# Patient Record
Sex: MALE | Race: BLACK OR AFRICAN AMERICAN | NOT HISPANIC OR LATINO | URBAN - METROPOLITAN AREA
[De-identification: names, ages, dates, MRNs, and addresses within clinical notes are randomized per-mention and may not be internally consistent; named-entity substitution may affect disease eponyms.]

---

## 2022-03-29 ENCOUNTER — INPATIENT (INPATIENT)
Facility: HOSPITAL | Age: 42
LOS: 1 days | Discharge: LEFT BEFORE DISCHARGE | DRG: 378 | End: 2022-03-31
Attending: HOSPITALIST | Admitting: STUDENT IN AN ORGANIZED HEALTH CARE EDUCATION/TRAINING PROGRAM
Payer: COMMERCIAL

## 2022-03-29 VITALS
OXYGEN SATURATION: 94 % | HEIGHT: 70 IN | TEMPERATURE: 98 F | SYSTOLIC BLOOD PRESSURE: 161 MMHG | DIASTOLIC BLOOD PRESSURE: 104 MMHG | WEIGHT: 250 LBS | HEART RATE: 86 BPM | RESPIRATION RATE: 18 BRPM

## 2022-03-29 LAB
ALBUMIN SERPL ELPH-MCNC: 4 G/DL — SIGNIFICANT CHANGE UP (ref 3.4–5)
ALP SERPL-CCNC: 69 U/L — SIGNIFICANT CHANGE UP (ref 40–120)
ALT FLD-CCNC: 30 U/L — SIGNIFICANT CHANGE UP (ref 12–42)
ANION GAP SERPL CALC-SCNC: 12 MMOL/L — SIGNIFICANT CHANGE UP (ref 9–16)
APTT BLD: 32.4 SEC — SIGNIFICANT CHANGE UP (ref 27.5–35.5)
AST SERPL-CCNC: 33 U/L — SIGNIFICANT CHANGE UP (ref 15–37)
BASOPHILS # BLD AUTO: 0.02 K/UL — SIGNIFICANT CHANGE UP (ref 0–0.2)
BASOPHILS # BLD AUTO: 0.04 K/UL — SIGNIFICANT CHANGE UP (ref 0–0.2)
BASOPHILS NFR BLD AUTO: 0.3 % — SIGNIFICANT CHANGE UP (ref 0–2)
BASOPHILS NFR BLD AUTO: 0.5 % — SIGNIFICANT CHANGE UP (ref 0–2)
BILIRUB SERPL-MCNC: 1.1 MG/DL — SIGNIFICANT CHANGE UP (ref 0.2–1.2)
BLD GP AB SCN SERPL QL: NEGATIVE — SIGNIFICANT CHANGE UP
BUN SERPL-MCNC: 16 MG/DL — SIGNIFICANT CHANGE UP (ref 7–23)
CALCIUM SERPL-MCNC: 9.1 MG/DL — SIGNIFICANT CHANGE UP (ref 8.5–10.5)
CHLORIDE SERPL-SCNC: 103 MMOL/L — SIGNIFICANT CHANGE UP (ref 96–108)
CO2 SERPL-SCNC: 25 MMOL/L — SIGNIFICANT CHANGE UP (ref 22–31)
CREAT SERPL-MCNC: 1.04 MG/DL — SIGNIFICANT CHANGE UP (ref 0.5–1.3)
EGFR: 91 ML/MIN/1.73M2 — SIGNIFICANT CHANGE UP
EOSINOPHIL # BLD AUTO: 0.1 K/UL — SIGNIFICANT CHANGE UP (ref 0–0.5)
EOSINOPHIL # BLD AUTO: 0.12 K/UL — SIGNIFICANT CHANGE UP (ref 0–0.5)
EOSINOPHIL NFR BLD AUTO: 1.4 % — SIGNIFICANT CHANGE UP (ref 0–6)
EOSINOPHIL NFR BLD AUTO: 1.5 % — SIGNIFICANT CHANGE UP (ref 0–6)
GLUCOSE SERPL-MCNC: 99 MG/DL — SIGNIFICANT CHANGE UP (ref 70–99)
HCT VFR BLD CALC: 35.8 % — LOW (ref 39–50)
HCT VFR BLD CALC: 41.8 % — SIGNIFICANT CHANGE UP (ref 39–50)
HGB BLD-MCNC: 12.2 G/DL — LOW (ref 13–17)
HGB BLD-MCNC: 14.4 G/DL — SIGNIFICANT CHANGE UP (ref 13–17)
IMM GRANULOCYTES NFR BLD AUTO: 0.1 % — SIGNIFICANT CHANGE UP (ref 0–1.5)
IMM GRANULOCYTES NFR BLD AUTO: 0.2 % — SIGNIFICANT CHANGE UP (ref 0–1.5)
INR BLD: 1.03 — SIGNIFICANT CHANGE UP (ref 0.88–1.16)
LACTATE SERPL-SCNC: 0.7 MMOL/L — SIGNIFICANT CHANGE UP (ref 0.4–2)
LIDOCAIN IGE QN: 198 U/L — SIGNIFICANT CHANGE UP (ref 73–393)
LYMPHOCYTES # BLD AUTO: 2.15 K/UL — SIGNIFICANT CHANGE UP (ref 1–3.3)
LYMPHOCYTES # BLD AUTO: 3.43 K/UL — HIGH (ref 1–3.3)
LYMPHOCYTES # BLD AUTO: 31.4 % — SIGNIFICANT CHANGE UP (ref 13–44)
LYMPHOCYTES # BLD AUTO: 39.5 % — SIGNIFICANT CHANGE UP (ref 13–44)
MAGNESIUM SERPL-MCNC: 2.2 MG/DL — SIGNIFICANT CHANGE UP (ref 1.6–2.6)
MCHC RBC-ENTMCNC: 30.3 PG — SIGNIFICANT CHANGE UP (ref 27–34)
MCHC RBC-ENTMCNC: 30.6 PG — SIGNIFICANT CHANGE UP (ref 27–34)
MCHC RBC-ENTMCNC: 34.1 GM/DL — SIGNIFICANT CHANGE UP (ref 32–36)
MCHC RBC-ENTMCNC: 34.4 GM/DL — SIGNIFICANT CHANGE UP (ref 32–36)
MCV RBC AUTO: 88.7 FL — SIGNIFICANT CHANGE UP (ref 80–100)
MCV RBC AUTO: 89.1 FL — SIGNIFICANT CHANGE UP (ref 80–100)
MONOCYTES # BLD AUTO: 0.69 K/UL — SIGNIFICANT CHANGE UP (ref 0–0.9)
MONOCYTES # BLD AUTO: 0.91 K/UL — HIGH (ref 0–0.9)
MONOCYTES NFR BLD AUTO: 10.1 % — SIGNIFICANT CHANGE UP (ref 2–14)
MONOCYTES NFR BLD AUTO: 10.5 % — SIGNIFICANT CHANGE UP (ref 2–14)
NEUTROPHILS # BLD AUTO: 3.87 K/UL — SIGNIFICANT CHANGE UP (ref 1.8–7.4)
NEUTROPHILS # BLD AUTO: 4.17 K/UL — SIGNIFICANT CHANGE UP (ref 1.8–7.4)
NEUTROPHILS NFR BLD AUTO: 47.9 % — SIGNIFICANT CHANGE UP (ref 43–77)
NEUTROPHILS NFR BLD AUTO: 56.6 % — SIGNIFICANT CHANGE UP (ref 43–77)
NRBC # BLD: 0 /100 WBCS — SIGNIFICANT CHANGE UP (ref 0–0)
NRBC # BLD: 0 /100 WBCS — SIGNIFICANT CHANGE UP (ref 0–0)
NT-PROBNP SERPL-SCNC: 14 PG/ML — SIGNIFICANT CHANGE UP
PLATELET # BLD AUTO: 294 K/UL — SIGNIFICANT CHANGE UP (ref 150–400)
PLATELET # BLD AUTO: 297 K/UL — SIGNIFICANT CHANGE UP (ref 150–400)
POTASSIUM SERPL-MCNC: 3.4 MMOL/L — LOW (ref 3.5–5.3)
POTASSIUM SERPL-SCNC: 3.4 MMOL/L — LOW (ref 3.5–5.3)
PROT SERPL-MCNC: 7.1 G/DL — SIGNIFICANT CHANGE UP (ref 6.4–8.2)
PROTHROM AB SERPL-ACNC: 11.9 SEC — SIGNIFICANT CHANGE UP (ref 10.5–13.4)
RBC # BLD: 4.02 M/UL — LOW (ref 4.2–5.8)
RBC # BLD: 4.71 M/UL — SIGNIFICANT CHANGE UP (ref 4.2–5.8)
RBC # FLD: 12.8 % — SIGNIFICANT CHANGE UP (ref 10.3–14.5)
RBC # FLD: 12.9 % — SIGNIFICANT CHANGE UP (ref 10.3–14.5)
RH IG SCN BLD-IMP: POSITIVE — SIGNIFICANT CHANGE UP
SARS-COV-2 RNA SPEC QL NAA+PROBE: SIGNIFICANT CHANGE UP
SODIUM SERPL-SCNC: 140 MMOL/L — SIGNIFICANT CHANGE UP (ref 132–145)
TROPONIN I, HIGH SENSITIVITY RESULT: 64.6 NG/L — SIGNIFICANT CHANGE UP
WBC # BLD: 6.84 K/UL — SIGNIFICANT CHANGE UP (ref 3.8–10.5)
WBC # BLD: 8.69 K/UL — SIGNIFICANT CHANGE UP (ref 3.8–10.5)
WBC # FLD AUTO: 6.84 K/UL — SIGNIFICANT CHANGE UP (ref 3.8–10.5)
WBC # FLD AUTO: 8.69 K/UL — SIGNIFICANT CHANGE UP (ref 3.8–10.5)

## 2022-03-29 PROCEDURE — 71275 CT ANGIOGRAPHY CHEST: CPT | Mod: 26

## 2022-03-29 PROCEDURE — 99285 EMERGENCY DEPT VISIT HI MDM: CPT

## 2022-03-29 PROCEDURE — 74174 CTA ABD&PLVS W/CONTRAST: CPT | Mod: 26

## 2022-03-29 PROCEDURE — 99291 CRITICAL CARE FIRST HOUR: CPT

## 2022-03-29 RX ORDER — ONDANSETRON 8 MG/1
4 TABLET, FILM COATED ORAL ONCE
Refills: 0 | Status: COMPLETED | OUTPATIENT
Start: 2022-03-29 | End: 2022-03-29

## 2022-03-29 RX ORDER — SODIUM CHLORIDE 9 MG/ML
1000 INJECTION, SOLUTION INTRAVENOUS ONCE
Refills: 0 | Status: COMPLETED | OUTPATIENT
Start: 2022-03-29 | End: 2022-03-29

## 2022-03-29 RX ORDER — PANTOPRAZOLE SODIUM 20 MG/1
8 TABLET, DELAYED RELEASE ORAL
Qty: 80 | Refills: 0 | Status: DISCONTINUED | OUTPATIENT
Start: 2022-03-29 | End: 2022-03-31

## 2022-03-29 RX ORDER — PANTOPRAZOLE SODIUM 20 MG/1
80 TABLET, DELAYED RELEASE ORAL ONCE
Refills: 0 | Status: COMPLETED | OUTPATIENT
Start: 2022-03-29 | End: 2022-03-29

## 2022-03-29 RX ADMIN — SODIUM CHLORIDE 1000 MILLILITER(S): 9 INJECTION, SOLUTION INTRAVENOUS at 23:32

## 2022-03-29 RX ADMIN — PANTOPRAZOLE SODIUM 80 MILLIGRAM(S): 20 TABLET, DELAYED RELEASE ORAL at 23:32

## 2022-03-29 RX ADMIN — ONDANSETRON 4 MILLIGRAM(S): 8 TABLET, FILM COATED ORAL at 23:32

## 2022-03-29 RX ADMIN — PANTOPRAZOLE SODIUM 10 MG/HR: 20 TABLET, DELAYED RELEASE ORAL at 23:30

## 2022-03-29 NOTE — ED ADULT NURSE REASSESSMENT NOTE - NS ED NURSE REASSESS COMMENT FT1
Pt. received AAOx3 semi fowlers in stretcher breathing at ease on room air in no apparent distress. 18g to LAC no redness, swelling, or tenderness noted. Pt. c/o chest and back pain. Pending labs and x-ray. Monitoring ongoing.

## 2022-03-29 NOTE — ED PROVIDER NOTE - PROGRESS NOTE DETAILS
Prior to discharge pt had an episode of massive hemoptysis ~500 mL EMS here to transport pt to the ICU at [facility] /72 and HR 75 pt is well appearing with no episodes of vomiting and speaking full sentneces Prior to discharge pt had an episode of massive hemoptysis ~500 mL, pt states that he is feeling fine with no abd pain and no lightheadedness.    2 18G IV's placed, Protonix push and drip started and pt endorsed to the ICU accepted to the MICU under Dr Hdez.

## 2022-03-29 NOTE — ED PROVIDER NOTE - ATTENDING CONTRIBUTION TO CARE
Patient had large hematemesis in bathroom, called ICU and patient upgraded to intensive care and transfer center aware

## 2022-03-29 NOTE — ED PROVIDER NOTE - CHPI ED SYMPTOMS NEG
no dizziness/no fever/no nausea/no numbness/no tingling/no weakness/no chills/no decreased eating/drinking

## 2022-03-29 NOTE — ED PROVIDER NOTE - NS ED ATTENDING STATEMENT MOD
This was a shared visit with the PATEL. I reviewed and verified the documentation and independently performed the documented:

## 2022-03-29 NOTE — ED ADULT TRIAGE NOTE - SPO2 (%)
94 Humira Counseling:  I discussed with the patient the risks of adalimumab including but not limited to myelosuppression, immunosuppression, autoimmune hepatitis, demyelinating diseases, lymphoma, and serious infections.  The patient understands that monitoring is required including a PPD at baseline and must alert us or the primary physician if symptoms of infection or other concerning signs are noted.

## 2022-03-29 NOTE — ED PROVIDER NOTE - PATIENT PORTAL LINK FT
You can access the FollowMyHealth Patient Portal offered by [facility] by registering at the following website: [email or web address]. By joining [facility]’s FollowMyHealth portal, you will also be able to view your health information using other applications (apps) compatible with our system.

## 2022-03-29 NOTE — ED PROVIDER NOTE - CARE PLAN
Principal Discharge DX:	Back pain   1 Principal Discharge DX:	GI bleed  Secondary Diagnosis:	Massive hemoptysis  Secondary Diagnosis:	Back pain

## 2022-03-29 NOTE — ED PROVIDER NOTE - CLINICAL SUMMARY MEDICAL DECISION MAKING FREE TEXT BOX
Patient here with back pain radiating to the chest. Awoke from sleep.   Denies pmhx   Exam unremarkable     Plan: labs,ekg,CTA r/o dissection

## 2022-03-29 NOTE — ED PROVIDER NOTE - OBJECTIVE STATEMENT
42 y/o male denies pmhx here with back pain radiating to the chest. Patient states that the pain is sharp and woke up him sleep. Denies fever, chills,abdominal pain, change in bowel function, flank pain, rash, HA, dizziness, SOB, CP, palpitations, diaphoresis, cough, and malaise. Patient appears well NAD stable.

## 2022-03-29 NOTE — ED ADULT NURSE REASSESSMENT NOTE - NS ED NURSE REASSESS COMMENT FT1
Pt. placed on CCM, and relocated for close obs. Pt. will be admitted to [facility] ICU for further eval. Pt. states that CP, back pain, and nausea.

## 2022-03-30 LAB
ALBUMIN SERPL ELPH-MCNC: 3.6 G/DL — SIGNIFICANT CHANGE UP (ref 3.3–5)
ALBUMIN SERPL ELPH-MCNC: 3.7 G/DL — SIGNIFICANT CHANGE UP (ref 3.3–5)
ALP SERPL-CCNC: 52 U/L — SIGNIFICANT CHANGE UP (ref 40–120)
ALP SERPL-CCNC: 55 U/L — SIGNIFICANT CHANGE UP (ref 40–120)
ALT FLD-CCNC: 17 U/L — SIGNIFICANT CHANGE UP (ref 10–45)
ALT FLD-CCNC: 18 U/L — SIGNIFICANT CHANGE UP (ref 10–45)
ANION GAP SERPL CALC-SCNC: 10 MMOL/L — SIGNIFICANT CHANGE UP (ref 5–17)
ANION GAP SERPL CALC-SCNC: 9 MMOL/L — SIGNIFICANT CHANGE UP (ref 5–17)
AST SERPL-CCNC: 22 U/L — SIGNIFICANT CHANGE UP (ref 10–40)
AST SERPL-CCNC: 24 U/L — SIGNIFICANT CHANGE UP (ref 10–40)
BASOPHILS # BLD AUTO: 0.03 K/UL — SIGNIFICANT CHANGE UP (ref 0–0.2)
BASOPHILS NFR BLD AUTO: 0.4 % — SIGNIFICANT CHANGE UP (ref 0–2)
BILIRUB SERPL-MCNC: 1 MG/DL — SIGNIFICANT CHANGE UP (ref 0.2–1.2)
BILIRUB SERPL-MCNC: 1.1 MG/DL — SIGNIFICANT CHANGE UP (ref 0.2–1.2)
BUN SERPL-MCNC: 14 MG/DL — SIGNIFICANT CHANGE UP (ref 7–23)
BUN SERPL-MCNC: 17 MG/DL — SIGNIFICANT CHANGE UP (ref 7–23)
CALCIUM SERPL-MCNC: 8 MG/DL — LOW (ref 8.4–10.5)
CALCIUM SERPL-MCNC: 8 MG/DL — LOW (ref 8.4–10.5)
CHLORIDE SERPL-SCNC: 106 MMOL/L — SIGNIFICANT CHANGE UP (ref 96–108)
CHLORIDE SERPL-SCNC: 108 MMOL/L — SIGNIFICANT CHANGE UP (ref 96–108)
CO2 SERPL-SCNC: 21 MMOL/L — LOW (ref 22–31)
CO2 SERPL-SCNC: 24 MMOL/L — SIGNIFICANT CHANGE UP (ref 22–31)
CREAT SERPL-MCNC: 0.84 MG/DL — SIGNIFICANT CHANGE UP (ref 0.5–1.3)
CREAT SERPL-MCNC: 1.04 MG/DL — SIGNIFICANT CHANGE UP (ref 0.5–1.3)
EGFR: 111 ML/MIN/1.73M2 — SIGNIFICANT CHANGE UP
EGFR: 91 ML/MIN/1.73M2 — SIGNIFICANT CHANGE UP
EOSINOPHIL # BLD AUTO: 0.08 K/UL — SIGNIFICANT CHANGE UP (ref 0–0.5)
EOSINOPHIL NFR BLD AUTO: 1.1 % — SIGNIFICANT CHANGE UP (ref 0–6)
GLUCOSE BLDC GLUCOMTR-MCNC: 96 MG/DL — SIGNIFICANT CHANGE UP (ref 70–99)
GLUCOSE SERPL-MCNC: 100 MG/DL — HIGH (ref 70–99)
GLUCOSE SERPL-MCNC: 97 MG/DL — SIGNIFICANT CHANGE UP (ref 70–99)
HCT VFR BLD CALC: 31.9 % — LOW (ref 39–50)
HCT VFR BLD CALC: 32.8 % — LOW (ref 39–50)
HGB BLD-MCNC: 10.7 G/DL — LOW (ref 13–17)
HGB BLD-MCNC: 10.7 G/DL — LOW (ref 13–17)
IMM GRANULOCYTES NFR BLD AUTO: 0.3 % — SIGNIFICANT CHANGE UP (ref 0–1.5)
LYMPHOCYTES # BLD AUTO: 2.6 K/UL — SIGNIFICANT CHANGE UP (ref 1–3.3)
LYMPHOCYTES # BLD AUTO: 35 % — SIGNIFICANT CHANGE UP (ref 13–44)
MAGNESIUM SERPL-MCNC: 1.9 MG/DL — SIGNIFICANT CHANGE UP (ref 1.6–2.6)
MAGNESIUM SERPL-MCNC: 2 MG/DL — SIGNIFICANT CHANGE UP (ref 1.6–2.6)
MCHC RBC-ENTMCNC: 29.2 PG — SIGNIFICANT CHANGE UP (ref 27–34)
MCHC RBC-ENTMCNC: 30.7 PG — SIGNIFICANT CHANGE UP (ref 27–34)
MCHC RBC-ENTMCNC: 32.6 GM/DL — SIGNIFICANT CHANGE UP (ref 32–36)
MCHC RBC-ENTMCNC: 33.5 GM/DL — SIGNIFICANT CHANGE UP (ref 32–36)
MCV RBC AUTO: 89.6 FL — SIGNIFICANT CHANGE UP (ref 80–100)
MCV RBC AUTO: 91.7 FL — SIGNIFICANT CHANGE UP (ref 80–100)
MONOCYTES # BLD AUTO: 0.72 K/UL — SIGNIFICANT CHANGE UP (ref 0–0.9)
MONOCYTES NFR BLD AUTO: 9.7 % — SIGNIFICANT CHANGE UP (ref 2–14)
NEUTROPHILS # BLD AUTO: 3.97 K/UL — SIGNIFICANT CHANGE UP (ref 1.8–7.4)
NEUTROPHILS NFR BLD AUTO: 53.5 % — SIGNIFICANT CHANGE UP (ref 43–77)
NRBC # BLD: 0 /100 WBCS — SIGNIFICANT CHANGE UP (ref 0–0)
NRBC # BLD: 0 /100 WBCS — SIGNIFICANT CHANGE UP (ref 0–0)
PHOSPHATE SERPL-MCNC: 2.1 MG/DL — LOW (ref 2.5–4.5)
PHOSPHATE SERPL-MCNC: 3 MG/DL — SIGNIFICANT CHANGE UP (ref 2.5–4.5)
PLATELET # BLD AUTO: 248 K/UL — SIGNIFICANT CHANGE UP (ref 150–400)
PLATELET # BLD AUTO: 253 K/UL — SIGNIFICANT CHANGE UP (ref 150–400)
POTASSIUM SERPL-MCNC: 3.9 MMOL/L — SIGNIFICANT CHANGE UP (ref 3.5–5.3)
POTASSIUM SERPL-MCNC: 4.1 MMOL/L — SIGNIFICANT CHANGE UP (ref 3.5–5.3)
POTASSIUM SERPL-SCNC: 3.9 MMOL/L — SIGNIFICANT CHANGE UP (ref 3.5–5.3)
POTASSIUM SERPL-SCNC: 4.1 MMOL/L — SIGNIFICANT CHANGE UP (ref 3.5–5.3)
PROT SERPL-MCNC: 5.5 G/DL — LOW (ref 6–8.3)
PROT SERPL-MCNC: 5.6 G/DL — LOW (ref 6–8.3)
RBC # BLD: 3.48 M/UL — LOW (ref 4.2–5.8)
RBC # BLD: 3.66 M/UL — LOW (ref 4.2–5.8)
RBC # FLD: 13.2 % — SIGNIFICANT CHANGE UP (ref 10.3–14.5)
RBC # FLD: 13.2 % — SIGNIFICANT CHANGE UP (ref 10.3–14.5)
SARS-COV-2 RNA SPEC QL NAA+PROBE: SIGNIFICANT CHANGE UP
SODIUM SERPL-SCNC: 137 MMOL/L — SIGNIFICANT CHANGE UP (ref 135–145)
SODIUM SERPL-SCNC: 141 MMOL/L — SIGNIFICANT CHANGE UP (ref 135–145)
WBC # BLD: 7.42 K/UL — SIGNIFICANT CHANGE UP (ref 3.8–10.5)
WBC # BLD: 7.88 K/UL — SIGNIFICANT CHANGE UP (ref 3.8–10.5)
WBC # FLD AUTO: 7.42 K/UL — SIGNIFICANT CHANGE UP (ref 3.8–10.5)
WBC # FLD AUTO: 7.88 K/UL — SIGNIFICANT CHANGE UP (ref 3.8–10.5)

## 2022-03-30 PROCEDURE — 71045 X-RAY EXAM CHEST 1 VIEW: CPT | Mod: 26

## 2022-03-30 PROCEDURE — 99291 CRITICAL CARE FIRST HOUR: CPT | Mod: 25

## 2022-03-30 PROCEDURE — 99222 1ST HOSP IP/OBS MODERATE 55: CPT

## 2022-03-30 PROCEDURE — 31500 INSERT EMERGENCY AIRWAY: CPT | Mod: GC

## 2022-03-30 PROCEDURE — 43255 EGD CONTROL BLEEDING ANY: CPT

## 2022-03-30 RX ORDER — CISATRACURIUM BESYLATE 2 MG/ML
20 INJECTION INTRAVENOUS ONCE
Refills: 0 | Status: COMPLETED | OUTPATIENT
Start: 2022-03-30 | End: 2022-03-30

## 2022-03-30 RX ORDER — NOREPINEPHRINE BITARTRATE/D5W 8 MG/250ML
0.05 PLASTIC BAG, INJECTION (ML) INTRAVENOUS
Qty: 8 | Refills: 0 | Status: DISCONTINUED | OUTPATIENT
Start: 2022-03-30 | End: 2022-03-30

## 2022-03-30 RX ORDER — MIDAZOLAM HYDROCHLORIDE 1 MG/ML
8 INJECTION, SOLUTION INTRAMUSCULAR; INTRAVENOUS ONCE
Refills: 0 | Status: DISCONTINUED | OUTPATIENT
Start: 2022-03-30 | End: 2022-03-30

## 2022-03-30 RX ORDER — SUCRALFATE 1 G
1 TABLET ORAL EVERY 6 HOURS
Refills: 0 | Status: DISCONTINUED | OUTPATIENT
Start: 2022-03-30 | End: 2022-03-31

## 2022-03-30 RX ORDER — CHLORHEXIDINE GLUCONATE 213 G/1000ML
1 SOLUTION TOPICAL
Refills: 0 | Status: DISCONTINUED | OUTPATIENT
Start: 2022-03-30 | End: 2022-03-31

## 2022-03-30 RX ORDER — FENTANYL CITRATE 50 UG/ML
0.5 INJECTION INTRAVENOUS
Qty: 2500 | Refills: 0 | Status: DISCONTINUED | OUTPATIENT
Start: 2022-03-30 | End: 2022-03-30

## 2022-03-30 RX ORDER — PROPOFOL 10 MG/ML
10 INJECTION, EMULSION INTRAVENOUS
Qty: 1000 | Refills: 0 | Status: DISCONTINUED | OUTPATIENT
Start: 2022-03-30 | End: 2022-03-30

## 2022-03-30 RX ORDER — FENTANYL CITRATE 50 UG/ML
200 INJECTION INTRAVENOUS ONCE
Refills: 0 | Status: DISCONTINUED | OUTPATIENT
Start: 2022-03-30 | End: 2022-03-30

## 2022-03-30 RX ORDER — SUCRALFATE 1 G
1 TABLET ORAL
Refills: 0 | Status: DISCONTINUED | OUTPATIENT
Start: 2022-03-30 | End: 2022-03-30

## 2022-03-30 RX ORDER — FENTANYL CITRATE 50 UG/ML
100 INJECTION INTRAVENOUS ONCE
Refills: 0 | Status: DISCONTINUED | OUTPATIENT
Start: 2022-03-30 | End: 2022-03-30

## 2022-03-30 RX ORDER — ERYTHROMYCIN ETHYLSUCCINATE 400 MG
250 TABLET ORAL ONCE
Refills: 0 | Status: COMPLETED | OUTPATIENT
Start: 2022-03-30 | End: 2022-03-30

## 2022-03-30 RX ORDER — MIDAZOLAM HYDROCHLORIDE 1 MG/ML
2 INJECTION, SOLUTION INTRAMUSCULAR; INTRAVENOUS ONCE
Refills: 0 | Status: DISCONTINUED | OUTPATIENT
Start: 2022-03-30 | End: 2022-03-30

## 2022-03-30 RX ORDER — INFLUENZA VIRUS VACCINE 15; 15; 15; 15 UG/.5ML; UG/.5ML; UG/.5ML; UG/.5ML
0.5 SUSPENSION INTRAMUSCULAR ONCE
Refills: 0 | Status: DISCONTINUED | OUTPATIENT
Start: 2022-03-30 | End: 2022-03-31

## 2022-03-30 RX ORDER — SODIUM CHLORIDE 9 MG/ML
1000 INJECTION, SOLUTION INTRAVENOUS
Refills: 0 | Status: DISCONTINUED | OUTPATIENT
Start: 2022-03-30 | End: 2022-03-30

## 2022-03-30 RX ADMIN — FENTANYL CITRATE 200 MICROGRAM(S): 50 INJECTION INTRAVENOUS at 11:20

## 2022-03-30 RX ADMIN — PROPOFOL 6.8 MICROGRAM(S)/KG/MIN: 10 INJECTION, EMULSION INTRAVENOUS at 12:03

## 2022-03-30 RX ADMIN — Medication 1 GRAM(S): at 18:36

## 2022-03-30 RX ADMIN — PROPOFOL 6.8 MICROGRAM(S)/KG/MIN: 10 INJECTION, EMULSION INTRAVENOUS at 13:18

## 2022-03-30 RX ADMIN — FENTANYL CITRATE 200 MICROGRAM(S): 50 INJECTION INTRAVENOUS at 11:50

## 2022-03-30 RX ADMIN — PANTOPRAZOLE SODIUM 10 MG/HR: 20 TABLET, DELAYED RELEASE ORAL at 06:01

## 2022-03-30 RX ADMIN — MIDAZOLAM HYDROCHLORIDE 6 MILLIGRAM(S): 1 INJECTION, SOLUTION INTRAMUSCULAR; INTRAVENOUS at 11:30

## 2022-03-30 RX ADMIN — CHLORHEXIDINE GLUCONATE 1 APPLICATION(S): 213 SOLUTION TOPICAL at 06:03

## 2022-03-30 RX ADMIN — Medication 1 GRAM(S): at 23:14

## 2022-03-30 RX ADMIN — Medication 62.5 MILLIMOLE(S): at 06:02

## 2022-03-30 RX ADMIN — Medication 250 MILLIGRAM(S): at 09:44

## 2022-03-30 RX ADMIN — SODIUM CHLORIDE 75 MILLILITER(S): 9 INJECTION, SOLUTION INTRAVENOUS at 06:02

## 2022-03-30 RX ADMIN — SODIUM CHLORIDE 75 MILLILITER(S): 9 INJECTION, SOLUTION INTRAVENOUS at 02:37

## 2022-03-30 NOTE — H&P ADULT - NSHPLABSRESULTS_GEN_ALL_CORE
12.2   8.69  )-----------( 294      ( 29 Mar 2022 23:24 )             35.8       03-29    140  |  103  |  16  ----------------------------<  99  3.4<L>   |  25  |  1.04    Ca    9.1      29 Mar 2022 19:17  Mg     2.2     03-29    TPro  7.1  /  Alb  4.0  /  TBili  1.1  /  DBili  x   /  AST  33  /  ALT  30  /  AlkPhos  69  03-29                  PT/INR - ( 29 Mar 2022 19:17 )   PT: 11.9 sec;   INR: 1.03          PTT - ( 29 Mar 2022 19:17 )  PTT:32.4 sec          CAPILLARY BLOOD GLUCOSE

## 2022-03-30 NOTE — H&P ADULT - NSHPPHYSICALEXAM_GEN_ALL_CORE
VITAL SIGNS:  T(C): 36.7 (03-29-22 @ 23:42), Max: 36.7 (03-29-22 @ 21:31)  T(F): 98.1 (03-29-22 @ 23:42), Max: 98.1 (03-29-22 @ 23:42)  HR: 73 (03-29-22 @ 23:42) (73 - 86)  BP: 111/63 (03-29-22 @ 23:42) (111/63 - 161/104)  BP(mean): --  RR: 18 (03-29-22 @ 23:42) (16 - 18)  SpO2: 99% (03-29-22 @ 23:42) (94% - 99%)  Wt(kg): --    PHYSICAL EXAM:    Constitutional: NAD  Head: NC/AT  Eyes: PERRL, EOMI, anicteric sclera  ENT: no nasal discharge; uvula midline, no oropharyngeal erythema or exudates; MMM  Neck: supple; no JVD or thyromegaly  Respiratory: CTA B/L; no W/R/R, no retractions  Cardiac: +S1/S2; RRR; no M/R/G; PMI non-displaced  Gastrointestinal: abdomen soft, NT/ND; no rebound or guarding; +BSx4  Extremities: WWP, no clubbing or cyanosis; no peripheral edema  Vascular: 2+ radial, femoral, DP/PT pulses B/L  Dermatologic: skin warm, dry and intact; no rashes, wounds, or scars  Neurologic: AAOx3; CNII-XII grossly intact; no focal deficits VITAL SIGNS:  T(C): 36.7 (03-29-22 @ 23:42), Max: 36.7 (03-29-22 @ 21:31)  T(F): 98.1 (03-29-22 @ 23:42), Max: 98.1 (03-29-22 @ 23:42)  HR: 73 (03-29-22 @ 23:42) (73 - 86)  BP: 111/63 (03-29-22 @ 23:42) (111/63 - 161/104)  BP(mean): --  RR: 18 (03-29-22 @ 23:42) (16 - 18)  SpO2: 99% (03-29-22 @ 23:42) (94% - 99%)  Wt(kg): --    PHYSICAL EXAM:    Constitutional: NAD  Head: NC/AT  Eyes: PERRL, EOMI, anicteric sclera  ENT: no nasal discharge; uvula midline, no oropharyngeal erythema or exudates; MMM  Neck: supple; no JVD or thyromegaly  Respiratory: CTA B/L; no W/R/R, no retractions  Cardiac: +S1/S2; RRR; no M/R/G; PMI non-displaced  Gastrointestinal: obese abdomen soft, NT/ND; no rebound or guarding; +BSx4  Extremities: WWP, no clubbing or cyanosis; no peripheral edema  Vascular: 2+ radial, femoral, DP/PT pulses B/L  Dermatologic: skin warm, dry and intact; no rashes, wounds, or scars  Neurologic: AAOx3; CNII-XII grossly intact; no focal deficits VITAL SIGNS:  T(C): 36.7 (03-29-22 @ 23:42), Max: 36.7 (03-29-22 @ 21:31)  T(F): 98.1 (03-29-22 @ 23:42), Max: 98.1 (03-29-22 @ 23:42)  HR: 73 (03-29-22 @ 23:42) (73 - 86)  BP: 111/63 (03-29-22 @ 23:42) (111/63 - 161/104)  BP(mean): --  RR: 18 (03-29-22 @ 23:42) (16 - 18)  SpO2: 99% (03-29-22 @ 23:42) (94% - 99%)  Wt(kg): --    PHYSICAL EXAM:    Constitutional: NAD  Head: NC/AT  Eyes: Pupils normal in size and reactive, anicteric sclera  ENT: no nasal discharge; uvula midline, no oropharyngeal erythema or exudates; MMM  Neck: supple; no JVD or thyromegaly  Respiratory: CTA B/L; no W/R/R, no retractions  Cardiac: +S1/S2; RRR; no M/R/G; PMI non-displaced  Gastrointestinal: obese abdomen soft, NT/ND; no rebound or guarding; +BSx4  Extremities: WWP, no clubbing or cyanosis; no peripheral edema  Vascular: 2+ radial, femoral, DP/PT pulses B/L  Dermatologic: skin warm, dry and intact; no rashes, wounds, or scars  Neurologic: AAOx3; CNII-XII grossly intact; no focal deficits VITAL SIGNS:  T(C): 36.7 (03-29-22 @ 23:42), Max: 36.7 (03-29-22 @ 21:31)  T(F): 98.1 (03-29-22 @ 23:42), Max: 98.1 (03-29-22 @ 23:42)  HR: 73 (03-29-22 @ 23:42) (73 - 86)  BP: 111/63 (03-29-22 @ 23:42) (111/63 - 161/104)  BP(mean): --  RR: 18 (03-29-22 @ 23:42) (16 - 18)  SpO2: 99% (03-29-22 @ 23:42) (94% - 99%)  Wt(kg): --    PHYSICAL EXAM:    Constitutional: NAD   Eyes: Pupils normal in size and reactive, anicteric sclera  ENT: no nasal discharge; uvula midline, no oropharyngeal erythema or exudates; MMM  Neck: supple; no JVD or thyromegaly  Respiratory: CTA B/L; no W/R/R, no retractions  Cardiac: +S1/S2; RRR; no M/R/G; PMI non-displaced  Gastrointestinal: obese abdomen soft, NT/ND; no rebound or guarding; +BSx4, no signs of ascites  Extremities: WWP, no clubbing or cyanosis; no peripheral edema  Vascular: 2+ radial, femoral, DP/PT pulses B/L  Dermatologic: No jaundice, bleeding plaques, purpura.   Neurologic: AAOx3; CNII-XII grossly intact; no focal deficits

## 2022-03-30 NOTE — CONSULT NOTE ADULT - ASSESSMENT
42 yo M w/ no significant PMHx presents with back pain and hematemesis    #Hematemesis  Patient presented with abd pain that readiated to the back.  He was seen with hematemesis in the ED.  He reports recent painkiller use, unclear what medication.  Denies use of A/C.  Patient was admitted to the ICU.    -EGD 3/30/22: A single cratered ulcer with visible vessel (Ruddy Class IIA) was found in the incisura of the stomach s/p injection w epinephrine and BICAP. Unsuccessful attempts with apposition of ulcer edges with hemoclip.   -Repeat EGD in 8 weeks  -PPI gtt for 72 hours before transition to PPI BID for 2 weeks  -Trend CBC  -Maintain active T&S and large bore IV  -Transfusion goal per primary team    -Please notify GI team if patient develops overt GI bleeding or change in hemodynamics    Recommendations d/w primary team  Case d/w [facility] att[facility]    Jericho Verde MD  Gastroenterology Fellow  c: [phone]

## 2022-03-30 NOTE — DIETITIAN INITIAL EVALUATION ADULT. - OTHER INFO
42 yo M with no significant medical history present with episode of hematemesis and black tarry stools, HD stable, concern for upper GI bleed likely 2/2 gastric ulcers, pending endoscopy.    At time of assessment pt seen in chair for initial assessment- per EMR pt is now intubated on VC/AC mode, ordered for sedation and pressor support, however, ? if running or held pending EGD. Denies nausea/vomiting at this time. Reports last bowel movement 3/29. Confirms NKFA. Pt reports poor PO intake x 1 day PTA. Reports usual body weight 250 pounds (consistent with dosing weight 250 pounds). Verbalizes no recent weight loss. No edema documented at this time. No pressure ulcers documented at this time. Doug score=18. Labs reviewed 3/30; electrolytes within normal limits at this time. Observed pt with no overt signs of muscle or fat wasting. Based on ASPEN guidelines, pt does not meet criteria for malnutrition at this time. Discussed current diet order NPO; provided pt with diet education regarding likelihood of diet advancement to clear liquid pending EGD results; amenable to education. Made aware RD remains available. RD to follow up per protocol. See nutrition recommendations below.

## 2022-03-30 NOTE — PROGRESS NOTE ADULT - ASSESSMENT
40 yo M with no significant medical history present with episode of hematemesis and black tarry stools, HD stable, concern for upper GI bleed likely 2/2 gastric ulcers, pending endoscopy      NEURO  No active issues    CVS  No active issues    PULM  No active issues    RENAL  No active issues    ENDO  No active issues    GI  #Upper GI bleed  Pt with acute onset of back pain radiating to chest, aortic dissection ruled out. Prior to discharge, episode of hematemesis of approx. 500cc dark red blood and black tarry stools associated with upper abd pain  Pt reports using 4 tabs of "painkiller" in the past 24h, unclear if NSAIDs or any AC.   Pt denies any smoking or alcohol consumption, history of liver disease, peptic ulcers.   Pt reports having an episode of hematemesis in the past, and being scope, but doesn't remember the details.   Physical exam benign, Hgb drop from 14.4 to 10.7 but BUN/Cr ratio at 16-17 (should be elevated in upper GI bleed)  concern for upper GI bleed 2/2 gastric ulcers 2/2 most likely H pylori, otherwise MHx unremarkable  - GI consulted, follow-up recs > EGD within 24h   - no need for octreotide since low concern for variceal bleeding  - c/w protonix gtt   - keep NPO    ID  No active issues    HEME  #GI bleed with drop in Hgb  Episode of hematemesis of approx. 500cc dark red blood. Hgb drop from 14.4 to 12.2 to 10.7 2/2 active bleeding with component of dilution 2/2 fluids  No additional episodes at [facility]  - c/w CBC Q4H close monitoring i/s/o acute drop of Hgb  - transfuse if Hgb<8 since active bleeding    PROPHYLAXIS  F: LR at 75cc/h  E: Replete PRN  Diet: NPO  DVT PPx: None  GI PPx: PPI gtt  Code: FULL  Dispo: MICU   42 yo M with no significant medical history present with episode of hematemesis and black tarry stools, HD stable, concern for upper GI bleed likely 2/2 gastric ulcers, pending endoscopy      NEURO  No active issues    CVS  No active issues    PULM  No active issues    RENAL  No active issues    ENDO  No active issues    GI  #Upper GI bleed  Pt with acute onset of back pain radiating to chest, aortic dissection ruled out. Prior to discharge, episode of hematemesis of approx. 500cc dark red blood and black tarry stools associated with upper abd pain  Pt reports using 4 tabs of "painkiller" in the past 24h, unclear if NSAIDs or any AC.   Pt denies any smoking or alcohol consumption, history of liver disease, peptic ulcers.   Pt reports having an episode of hematemesis in the past, and being scope, but doesn't remember the details.   Physical exam benign, Hgb drop from 14.4 to 10.7 but BUN/Cr ratio at 16-17 (should be elevated in upper GI bleed)  concern for upper GI bleed 2/2 gastric ulcers 2/2 most likely H pylori, otherwise MHx unremarkable  - s/p EGD with planned intubation: showed gastric ulcer, 2 clips placed  - no need for octreotide since low concern for variceal bleeding  - Protonix ggt x72hrs, then IV BID, discharge on PO PPI  - advanced to [facility]    ID  No active issues    HEME  #GI bleed with drop in Hgb  Episode of hematemesis of approx. 500cc dark red blood. Hgb drop from 14.4 to 12.2 to 10.7 2/2 active bleeding with component of dilution 2/2 fluids  No additional episodes at [facility]  - transfuse if Hgb<8 since active bleeding    PROPHYLAXIS  F: LR at 75cc/h  E: Replete PRN  Diet: NPO  DVT PPx: None  GI PPx: PPI gtt  Code: FULL  Dispo: MICU

## 2022-03-30 NOTE — CONSULT NOTE ADULT - ASSESSMENT
42 yo with no significant MHx presented to [facility] ED with cc of back pain radiating to his sternum that started woke him up from his sleep, describes pain as sharp/stabbing and mild 5/10 in severity, last for a few hours. Pt denies any similar symptoms in the past. Neurology consulted for patient's episodes of somnolence post extubation.     #Post extubation AMS    42 yo with no significant MHx presented to [facility] ED with cc of back pain radiating to his sternum that started woke him up from his sleep, describes pain as sharp/stabbing and mild 5/10 in severity, last for a few hours. Pt denies any similar symptoms in the past. Neurology consulted for patient's episodes of somnolence post extubation.     #Post extubation AMS   Pt post extubation since 2pm 3/30 and was sedated with fentanyl and propofol. Pt is in and out of conversation and at times becomes unresponsive however was able to arouse with just voice. Did not appreciate nystagmus or jaw clenching or and seizure like activity. However given his critical state ddx include delirium post sedation vs seizures.  - Obtain VEEG to r/o seizure like activity   - Would not start AEDs at this time.  - Neuro team will continue to follow.     Discussed with attending, Dr. Costello.

## 2022-03-30 NOTE — CONSULT NOTE ADULT - SUBJECTIVE AND OBJECTIVE BOX
HPI:  42 yo M w/ no significant PMHx presents with back pain and hematemesis.  Patient reports being from shelter and has been taking painkiller for back pain, unclear if narcotic vs NSAIDs.  Pain from abd to the back.  He presented to the ED, and was ruled out for aortic dissection.  However, he developed hematemesis and was witnessed around 500 mL of blood.  Patient reports similar episodes in the past but unclear significance or prior endoscopic evaluation.  Pain resolved after episode of hematemesis.   Denies other medical hx.      Allergies    No Known Allergies    Intolerances      Home Medications:    MEDICATIONS:  MEDICATIONS  (STANDING):  chlorhexidine 2% Cloths 1 Application(s) Topical <User Schedule>  influenza   Vaccine 0.5 milliLiter(s) IntraMuscular once  midazolam Injectable 8 milliGRAM(s) IV Push once  pantoprazole Infusion 8 mG/Hr (10 mL/Hr) IV Continuous <Continuous>  sucralfate 1 Gram(s) Oral four times a day    MEDICATIONS  (PRN):    PAST MEDICAL & SURGICAL HISTORY:  No pertinent past medical history    No significant past surgical history      FAMILY HISTORY:  No pertinent family history in first degree relatives      SOCIAL HISTORY:  Tobacco: [ ] Current, [ ] Former, [ ] Never; Pack Years:  Alcohol:  Illicit Drugs:    REVIEW OF SYSTEMS:  All other 10 review of systems is negative except as indicated in HPI    Vital Signs Last 24 Hrs  T(C): 36.8 (30 Mar 2022 14:41), Max: 36.8 (30 Mar 2022 05:19)  T(F): 98.3 (30 Mar 2022 14:41), Max: 98.3 (30 Mar 2022 05:19)  HR: 67 (30 Mar 2022 16:00) (64 - 94)  BP: 121/72 (30 Mar 2022 16:00) (102/63 - 161/104)  BP(mean): 89 (30 Mar 2022 16:00) (77 - 122)  RR: 12 (30 Mar 2022 16:00) (12 - 29)  SpO2: 99% (30 Mar 2022 16:00) (94% - 100%)    03-29 @ 07:01  -  03-30 @ 07:00  --------------------------------------------------------  IN: 720 mL / OUT: 0 mL / NET: 720 mL    03-30 @ 07:01  -  03-30 @ 17:04  --------------------------------------------------------  IN: 590 mL / OUT: 600 mL / NET: -10 mL      Mode: AC/ CMV (Assist Control/ Continuous Mandatory Ventilation)  RR (machine): 15  TV (machine): 480  FiO2: 100  PEEP: 5  ITime: 1  MAP: 7.9  PIP: 16    PHYSICAL EXAM:    General: Comfortable in bed, in no acute distress  Eyes: moist conjunctivae, sclerae anicteric  HENT: Moist mucous membranes, tongue midline  Abdomen: Soft, non-tender non-distended, No rebound or guarding  Neurological: Alert and oriented x3, nonfocal exam  Skin: Warm and dry. No obvious rash    LABS:                        10.7   7.42  )-----------( 253      ( 30 Mar 2022 09:18 )             32.8     03-30    141  |  108  |  14  ----------------------------<  97  3.9   |  24  |  1.04    Ca    8.0<L>      30 Mar 2022 09:18  Phos  3.0     03-30  Mg     1.9     03-30    TPro  5.5<L>  /  Alb  3.7  /  TBili  1.1  /  DBili  x   /  AST  22  /  ALT  17  /  AlkPhos  55  03-30        PT/INR - ( 29 Mar 2022 19:17 )   PT: 11.9 sec;   INR: 1.03          PTT - ( 29 Mar 2022 19:17 )  PTT:32.4 sec    RADIOLOGY & ADDITIONAL STUDIES:  Reviewed
  NEUROLOGY CONSULT    HPI:  40 yo with no significant MHx presented to [facility] ED with cc of back pain radiating to his sternum that started woke him up from his sleep, describes pain as sharp/stabbing and mild 5/10 in severity, last for a few hours. Pt denies any similar symptoms in the past.      [facility] ED course:  VS: T 97.5, HR 86, BP right 161/106 and left 141/104, RR 18, SpO2 94% on RA  Labs: WBC 6.84, Hgb 14.4, Plt 297, coags wnl, CMP unremarkable except for K 3.4. LFTs wnl, Trop negative  CTA chest and A/P: No aortic dissection  ECG: NSR, KY and QRS wnl, no ST or T wave changes    Interval course: pt was about to get discharged from [facility] ED but had an episode of hematemesis ~500cc (dark red in color) and black tarry stools accompanied by upper abd pain that lasted a few mins. Pt denies having any retching or coughing prior to the hematemesis. Repeat Hgb 12.2. Repeat VS stable.     Interventions: zofran 4 mg IVP, 1L bolus of LR, PPI 80 mg IVP followed by PPI gtt at 8mg/h, started on LR 75cc/h for 12h    Upon arrival to [facility] MICU:  Pt denies any additional episodes of hematemesis, pain including back pain, CP or abd pain, palpitations, dizziness or lightheadedness.   Pt reports using 4 tabs of "painkiller" in the past 24h, unclear if NSAIDs or any AC.   Pt denies any smoking or alcohol consumption, history of liver disease, peptic ulcers.   Pt reports having an episode of hematemesis in the past, and being scope, but doesn't remember the details.   Vitals stable  Labs remarkable for Hgb 10.7.   Consult: GI (30 Mar 2022 00:23)      HOSPITAL COURSE:    SUBJECTIVE: .Pt seen and examined at bedside. Pt intermittently closing eyes and not verbalizing during interview. However upon reorientation with his name, does respond appropriately and respond to commands. Pt states the reason he closes his eyes is 2/2 to photophobia that is new seen since his extubation. Denies hx of seizures, stroke, ringing in ear, blurry vision, weakness, or headaches.     MEDICATIONS  Home Medications:    MEDICATIONS  (STANDING):  chlorhexidine 2% Cloths 1 Application(s) Topical <User Schedule>  influenza   Vaccine 0.5 milliLiter(s) IntraMuscular once  midazolam Injectable 8 milliGRAM(s) IV Push once  pantoprazole Infusion 8 mG/Hr (10 mL/Hr) IV Continuous <Continuous>  sucralfate 1 Gram(s) Oral four times a day    MEDICATIONS  (PRN):      FAMILY HISTORY:  No pertinent family history in first degree relatives      SOCIAL HISTORY: negative for tobacco, alcohol, or ilicit drug use.    Allergies    No Known Allergies    Intolerances        Height (cm): 177.8 (03-29 @ 18:17)  Weight (kg): 113.4 (03-29 @ 18:17)  BMI (kg/m2): 35.9 (03-29 @ 18:17)    Vital Signs Last 24 Hrs  T(C): 36.8 (30 Mar 2022 14:41), Max: 36.8 (30 Mar 2022 05:19)  T(F): 98.3 (30 Mar 2022 14:41), Max: 98.3 (30 Mar 2022 05:19)  HR: 67 (30 Mar 2022 16:00) (64 - 94)  BP: 121/72 (30 Mar 2022 16:00) (102/63 - 161/104)  BP(mean): 89 (30 Mar 2022 16:00) (77 - 122)  RR: 12 (30 Mar 2022 16:00) (12 - 29)  SpO2: 99% (30 Mar 2022 16:00) (94% - 100%)      PHYSICAL EXAMINATION:  General: Comfortable, pleasant/anxious/agitated, Ill-appearing, well-nourished/frail/cachectic, comfortable / in distress  Neurologic:     -Mental Status: AAOx3. Speech is fluent with intact naming, no dysarthria. Pt stating he cannot spell WORLD forwards or backwards and cannot count from serial 7s.  Recent and remote memory intact. Follows commands. Attention/concentration impaired, intermittently not responsive during interview, but able to arouse with his name.      -Cranial Nerves:          II: Visual fields are full to confrontation.          III, IV, VI: EOMI without nystagmus. Overcorrection from L gaze to central. pupils 5mm and responsive to light however pt resistant to testing.          V:  Facial sensation V1-V3 equal and intact           VII: Face is symmetric with normal eye closure and smile          VIII: Hearing is bilaterally intact to finger rub          IX, X: Uvula is midline and soft palate rises symmetrically          XI: Head turning and shoulder shrug are intact.          XII: Tongue protrudes midline     -Motor: Normal bulk and tone. No involuntary movements (tremor, myoclonus, chorea, athetosis, dystonia)          Upper extremities: 5/5 shoulder abduction,5/5 elbow flexion/extension, 5/5 handgrip          Lower extremities: 5/5 hip flexion, 5/5 knee extension/flexion,5/5  plantar flexion, 5/5 ankle dorsiflexion          No pronator drift. Rapid alternating movements intact and symmetric     -Sensation: Intact to light touch and pain.          No neglect or extinction on double simultaneous testing     -Reflexes: 2+ and symmetric at biceps, patellar, ankles          Plantar reflexes downgoing bilaterally. Downgoing toes bilaterally            LABS:                        10.7   7.42  )-----------( 253      ( 30 Mar 2022 09:18 )             32.8     03-30    141  |  108  |  14  ----------------------------<  97  3.9   |  24  |  1.04    Ca    8.0<L>      30 Mar 2022 09:18  Phos  3.0     03-30  Mg     1.9     03-30    TPro  5.5<L>  /  Alb  3.7  /  TBili  1.1  /  DBili  x   /  AST  22  /  ALT  17  /  AlkPhos  55  03-30    Hemoglobin A1C:   Vitamin B12   PT/INR - ( 29 Mar 2022 19:17 )   PT: 11.9 sec;   INR: 1.03          PTT - ( 29 Mar 2022 19:17 )  PTT:32.4 sec  CAPILLARY BLOOD GLUCOSE      POCT Blood Glucose.: 96 mg/dL (30 Mar 2022 14:48)              Microbiology:      RADIOLOGY, EKG AND ADDITIONAL TESTS: Reviewed.

## 2022-03-30 NOTE — DIETITIAN INITIAL EVALUATION ADULT. - OTHER CALCULATIONS
Based on Standards of Care pt >100% IBW thus ideal body weight used for all calculations. Needs adjusted for current clinical course.

## 2022-03-30 NOTE — H&P ADULT - HISTORY OF PRESENT ILLNESS
*INCOMPLETE*  42 yo with no significant MHx presented to [facility] ED with cc of back pain radiating to his sternum that started woke him up from his sleep, describes pain as sharp/stabbing and mild 5/10 in severity. Pt denies any previous similar symptoms. Pt denies any N/V, abd pain, dizziness or lightheadedness.     [facility] ED course:  VS: T 97.5, HR 86, BP right 161/106 and left 141/104, RR 18, SpO2 94% on RA  Labs: WBC 6.84, Hgb 14.4, Plt 297, CMP unremarkable except for K 3.4. AST 33 / ALT 30, Trop negative, lactate 0.7  CTA chest and A/P: No aortic dissection  ECG: X    Interval course at [facility]: pt was about to get discharged from [facility] ED but had an episode of hematemesis ~500cc. Repeat Hgb 12.2. Repeat VS stable. Pt denied any abd pain, dizziness, lightheadedness.     Interventions: zofran 4 mg IVP, 1L bolus of LR, PPI 80 mg IVP followed by PPI gtt at 8mg/h    Upon arrival to MICU:  GI consulted   40 yo with no significant MHx presented to [facility] ED with cc of back pain radiating to his sternum that started woke him up from his sleep, describes pain as sharp/stabbing and mild 5/10 in severity. Pt reports using 4 tabs of "painkiller" in the past 24h, denies any smoking or alcohol consumption, history of peptic ulcers.     [facility] ED course:  VS: T 97.5, HR 86, BP right 161/106 and left 141/104, RR 18, SpO2 94% on RA  Labs: WBC 6.84, Hgb 14.4, Plt 297, CMP unremarkable except for K 3.4. AST 33 / ALT 30, Trop negative, lactate 0.7  CTA chest and A/P: No aortic dissection    Interval course: pt was about to get discharged from [facility] ED but had an episode of hematemesis ~500cc and black stools accompanied by generalized abd pain. Repeat Hgb 12.2. Repeat VS stable. Pt denied any dizziness, lightheadedness. Pt reports similar episode in the past, and being scope, but doesn't remember the details.     Interventions: zofran 4 mg IVP, 1L bolus of LR, PPI 80 mg IVP followed by PPI gtt at 8mg/h  Consult: GI 42 yo with no significant MHx presented to [facility] ED with cc of back pain radiating to his sternum that started woke him up from his sleep, describes pain as sharp/stabbing and mild 5/10 in severity. Pt reports using 4 tabs of "painkiller" in the past 24h, denies any smoking or alcohol consumption, history of peptic ulcers.     [facility] ED course:  VS: T 97.5, HR 86, BP right 161/106 and left 141/104, RR 18, SpO2 94% on RA  Labs: WBC 6.84, Hgb 14.4, Plt 297, CMP unremarkable except for K 3.4. AST 33 / ALT 30, Trop negative, lactate 0.7  CTA chest and A/P: No aortic dissection    Interval course: pt was about to get discharged from [facility] ED but had an episode of hematemesis ~500cc and black stools accompanied by generalized abd pain. Repeat Hgb 12.2. Repeat VS stable. Pt denied any dizziness, lightheadedness. Pt initially denied but later reported having a similar episode in the past, and being scope, but doesn't remember the details.     Interventions: zofran 4 mg IVP, 1L bolus of LR, PPI 80 mg IVP followed by PPI gtt at 8mg/h  Consult: GI 42 yo with no significant MHx presented to [facility] ED with cc of back pain radiating to his sternum that started woke him up from his sleep, describes pain as sharp/stabbing and mild 5/10 in severity. Pt reports using 4 tabs of "painkiller" in the past 24h, denies any smoking or alcohol consumption, history of peptic ulcers.     [facility] ED course:  VS: T 97.5, HR 86, BP right 161/106 and left 141/104, RR 18, SpO2 94% on RA  Labs: WBC 6.84, Hgb 14.4, Plt 297, CMP unremarkable except for K 3.4. AST 33 / ALT 30, Trop negative, lactate 0.7  CTA chest and A/P: No aortic dissection    Interval course: pt was about to get discharged from [facility] ED but had an episode of hematemesis ~500cc (dark red in color) and black stools accompanied by generalized abd pain. Repeat Hgb 12.2. Repeat VS stable. Pt denied any dizziness, lightheadedness. Pt initially denied but later reported having a similar episode in the past, and being scope, but doesn't remember the details.     Interventions: zofran 4 mg IVP, 1L bolus of LR, PPI 80 mg IVP followed by PPI gtt at 8mg/h  Consult: GI 40 yo with no significant MHx presented to [facility] ED with cc of back pain radiating to his sternum that started woke him up from his sleep, describes pain as sharp/stabbing and mild 5/10 in severity. Pt reports using 4 tabs of "painkiller" in the past 24h, denies any smoking or alcohol consumption, history of peptic ulcers.     [facility] ED course:  VS: T 97.5, HR 86, BP right 161/106 and left 141/104, RR 18, SpO2 94% on RA  Labs: WBC 6.84, Hgb 14.4, Plt 297, CMP unremarkable except for K 3.4. AST 33 / ALT 30, Trop negative, lactate 0.7  CTA chest and A/P: No aortic dissection    Interval course: pt was about to get discharged from [facility] ED but had an episode of hematemesis ~500cc (dark red in color) and black stools accompanied by generalized abd pain. Repeat Hgb 12.2. Repeat VS stable. Pt denied any dizziness, lightheadedness. Pt initially denied but later reported having a similar episode in the past, and being scope, but doesn't remember the details.     Interventions: zofran 4 mg IVP, 1L bolus of LR, PPI 80 mg IVP followed by PPI gtt at 8mg/h    Upon arrival to [facility] MICU, pt denies any additional episodes of hematemesis, pain including back pain, CP or abd pain, dizziness or lightheadedness.   Consult: GI 42 yo with no significant MHx presented to [facility] ED with cc of back pain radiating to his sternum that started woke him up from his sleep, describes pain as sharp/stabbing and mild 5/10 in severity. Pt reports using 4 tabs of "painkiller" in the past 24h, denies any smoking or alcohol consumption, history of peptic ulcers.     [facility] ED course:  VS: T 97.5, HR 86, BP right 161/106 and left 141/104, RR 18, SpO2 94% on RA  Labs: WBC 6.84, Hgb 14.4, Plt 297, CMP unremarkable except for K 3.4. AST 33 / ALT 30, Trop negative, lactate 0.7  CTA chest and A/P: No aortic dissection  ECG: NSR, MO and QRS wnl, no ST or T wave changes    Interval course: pt was about to get discharged from [facility] ED but had an episode of hematemesis ~500cc (dark red in color) and black stools accompanied by generalized abd pain. Repeat Hgb 12.2. Repeat VS stable. Pt denied any dizziness, lightheadedness. Pt initially denied but later reported having a similar episode in the past, and being scope, but doesn't remember the details.     Interventions: zofran 4 mg IVP, 1L bolus of LR, PPI 80 mg IVP followed by PPI gtt at 8mg/h    Upon arrival to [facility] MICU, pt denies any additional episodes of hematemesis, pain including back pain, CP or abd pain, dizziness or lightheadedness.   Consult: GI 42 yo with no significant MHx presented to [facility] ED with cc of back pain radiating to his sternum that started woke him up from his sleep, describes pain as sharp/stabbing and mild 5/10 in severity, last for a few hours. Pt denies any similar symptoms in the past.      [facility] ED course:  VS: T 97.5, HR 86, BP right 161/106 and left 141/104, RR 18, SpO2 94% on RA  Labs: WBC 6.84, Hgb 14.4, Plt 297, coags wnl, CMP unremarkable except for K 3.4. LFTs wnl, Trop negative  CTA chest and A/P: No aortic dissection  ECG: NSR, ME and QRS wnl, no ST or T wave changes    Interval course: pt was about to get discharged from [facility] ED but had an episode of hematemesis ~500cc (dark red in color) and black tarry stools accompanied by upper abd pain that lasted a few mins. Pt denies having any retching or coughing prior to the hematemesis. Repeat Hgb 12.2. Repeat VS stable.     Interventions: zofran 4 mg IVP, 1L bolus of LR, PPI 80 mg IVP followed by PPI gtt at 8mg/h, started on LR 75cc/h for 12h    Upon arrival to [facility] MICU:  Pt denies any additional episodes of hematemesis, pain including back pain, CP or abd pain, palpitations, dizziness or lightheadedness.   Pt reports using 4 tabs of "painkiller" in the past 24h, unclear if NSAIDs or any AC.   Pt denies any smoking or alcohol consumption, history of liver disease, peptic ulcers.   Pt reports having an episode of hematemesis in the past, and being scope, but doesn't remember the details.   Vitals stable  Labs remarkable for Hgb 10.7.   Consult: GI

## 2022-03-30 NOTE — DIETITIAN INITIAL EVALUATION ADULT. - PERTINENT LABORATORY DATA
Sodium, Serum: 141 mmol/L  Potassium, Serum: 3.9 mmol/L  Chloride, Serum: 108 mmol/L  BUN, Serum: 14 mg/dL  Creatinine, Serum: 1.04 mg/dL  Glucose, Serum: 97 mg/dL  Calcium, Serum: 8.0 mg/dL (Low)  Magnesium, Serum: 1.9 mg/dL  Phosphorus, Serum: 3.0 mg/dL

## 2022-03-30 NOTE — H&P ADULT - ATTENDING COMMENTS
41Y M transferred from [facility] for UGIB. presented with c/o abd pain radiating to the back CTa was negative for aortic dissection.  planned to Dc the patient but in the bathroom he had a large volume bloody vomitus and then had a melanotic stool. patient remained hemodynamically stable throughout and was protecting his airway.  transferred to our MICU were he had no active complaints. hemoglobin has been dropping. no more vomiting or abd pain. denied any chronic nsaid use or drinking. claims he had a similar episode in the past and was scoped years ago but cannot remember where.   GI consulted overngiht and plan to scope in the AM    for now keep two large bore ivs  keep active type and screen  protonix drip  serial CBCs  transfuse if hemoglobin is <7 or patient becomes unstable  keep NPO  avoid any anticoagulation or antiplatelet drugs.   no stigmata of cirrhosis so no indication for octreotide.

## 2022-03-30 NOTE — CHART NOTE - NSCHARTNOTEFT_GEN_A_CORE
Procedure Note  EGD  Attending: Dr. Alfaro    Impression:  A single cratered ulcer with visible vessel (Ruddy Class IIA) was found in the incisura of the stomach s/p injection w epinephrine and BICAP. Unsuccessful attempts with apposition of ulcer edges with hemoclip. .       Plan:   PPI gtt for 72 hours before transition to PPI BID for 2 weeks  CBC Procedure Note  EGD  Attending: Dr. Alfaro    Impression:  A single cratered ulcer with visible vessel (Ruddy Class IIA) was found in the incisura of the stomach s/p injection w epinephrine and BICAP. Unsuccessful attempts with apposition of ulcer edges with hemoclip.      Plan:   PPI gtt for 72 hours before transition to PPI BID for 2 weeks  CBC  Repeat EGD in 8 weeks

## 2022-03-30 NOTE — CONSULT NOTE ADULT - ATTENDING COMMENTS
Acute blood loss anemia secondary to hematemesis,   HD stable, s/p emergent EGD - Incisural ulcer in stomach with adherent clot, s/p dual intervention (epinephrine and bipolar probe) to prevent recurrent bleed.  Recommend PPI IV for 72 hours and Carafate   Monitor for recurrent bleeding.
history and fluctuating exam suggest delerium.  most likely cause is toxic encephalopathy from sedation.  if he is not improving would consider vEEG
Detail Level: Detailed
Detail Level: Zone

## 2022-03-30 NOTE — DIETITIAN INITIAL EVALUATION ADULT. - ADD RECOMMEND
1. Defer diet advancement to team, if medically feasible advance to [facility] 2. Pain and bowel regimen per team 3. Will assess/honor preferences as able 4. Monitor electrolytes; replete PRN

## 2022-03-30 NOTE — H&P ADULT - NSHPSOCIALHISTORY_GEN_ALL_CORE
Smoking  EtOH  other drugs Smoking: denies  EtOH: denies  other drugs: denies Living situation: Shelter  Smoking: denies  EtOH: denies  other drugs: denies

## 2022-03-30 NOTE — H&P ADULT - ASSESSMENT
42 yo M with no significant medical history present with acute onset back pain radiating to chest, s/p rule out of aortic dissection per CTA, now with episode of hematemesis, on PPI gtt, concern for upper GI upper      NEURO  No active issues    CVS  No active issues    PULM  No active issues    RENAL  No active issues    ENDO  No active issues    GI  #Hematemesis with c/f upper GI bleed  Pt with acute onset of back pain radiating to chest, aortic dissection ruled out. Prior to discharge, episode of hematemesis of abpprox. 500cc bright red blood.   - GI consulted, follow-up recs  - consider octreotide gtt if c/f variceal bleed  - c/w protonix gtt   - keep NPO    ID  No active issues    HEME  #Hematemesis with drop in Hgb  Episode of hematemesis of abpprox. 500cc bright red blood. Hgb drop from 14.4 to 12.2.   - Monitor H/H  - transfuse if Hgb<7    PROPHYLAXIS  F:   E: Replete PRN  Diet: NPO  DVT PPx: SCDs  GI PPx: PPI gtt  Code: FULL  Dispo: MICU   42 yo M with no significant medical history present with acute onset back pain radiating to chest, s/p rule out of aortic dissection per CTA, now with episode of hematemesis and black stools, on PPI gtt, GI consulted for EGD      NEURO  No active issues    CVS  No active issues    PULM  No active issues    RENAL  No active issues    ENDO  No active issues    GI  #GI bleed  Pt with acute onset of back pain radiating to chest, aortic dissection ruled out. Prior to discharge, episode of hematemesis of approx. 500cc bright red blood and black stools  - GI consulted, follow-up recs > EGD  - consider octreotide gtt if c/f variceal bleed  - c/w protonix gtt   - keep NPO    ID  No active issues    HEME  #GI bleed with drop in Hgb  Episode of hematemesis of approx. 500cc bright red blood. Hgb drop from 14.4 to 12.2.   - Monitor H/H  - transfuse if Hgb<7    PROPHYLAXIS  F: LR   E: Replete PRN  Diet: NPO  DVT PPx: SCDs  GI PPx: PPI gtt  Code: FULL  Dispo: MICU   40 yo M with no significant medical history present with acute onset back pain radiating to chest, s/p rule out of aortic dissection per CTA, now with episode of hematemesis and black stools, on PPI gtt, GI consulted for EGD      NEURO  No active issues    CVS  No active issues    PULM  No active issues    RENAL  No active issues    ENDO  No active issues    GI  #GI bleed  Pt with acute onset of back pain radiating to chest, aortic dissection ruled out. Prior to discharge, episode of hematemesis of approx. 500cc dark red blood and black stools  Pt reports having similar episode in the past, and being scope, no additional details provided  no EtOH consumption, smoking history or chronic NSAIDs use, however concerning for bleeding ulcer  - GI consulted, follow-up recs > EGD  - consider octreotide gtt if c/f variceal bleed  - c/w protonix gtt   - keep NPO    ID  No active issues    HEME  #GI bleed with drop in Hgb  Episode of hematemesis of approx. 500cc dark red blood. Hgb drop from 14.4 to 12.2.   - Monitor H/H  - transfuse if Hgb<7    PROPHYLAXIS  F: LR   E: Replete PRN  Diet: NPO  DVT PPx: SCDs  GI PPx: PPI gtt  Code: FULL  Dispo: MICU   42 yo M with no significant medical history present with acute onset back pain radiating to chest, s/p rule out of aortic dissection per CTA, now with concern for upper GI bleed, suspecting gastric ulcers      NEURO  No active issues    CVS  No active issues    PULM  No active issues    RENAL  No active issues    ENDO  No active issues    GI  #Upper GI bleed  Pt with acute onset of back pain radiating to chest, aortic dissection ruled out. Prior to discharge, episode of hematemesis of approx. 500cc dark red blood and black tarry stools associated with upper abd pain  Pt reports using 4 tabs of "painkiller" in the past 24h, unclear if NSAIDs or any AC.   Pt denies any smoking or alcohol consumption, history of liver disease, peptic ulcers.   Pt reports having an episode of hematemesis in the past, and being scope, but doesn't remember the details.   concern for upper GI bleed 2/2 gastric ulcers  - GI consulted, follow-up recs > EGD within 24h  - start octreotide gtt if variceal bleeding suspected  - c/w protonix gtt   - keep NPO    ID  No active issues    HEME  #GI bleed with drop in Hgb  Episode of hematemesis of approx. 500cc dark red blood. Hgb drop from 14.4 to 12.2 to 10.7  No additional episodes at H  - c/w CBC Q4H  - transfuse if Hgb<8    PROPHYLAXIS  F: LR at 75cc/h  E: Replete PRN  Diet: NPO  DVT PPx: None  GI PPx: PPI gtt  Code: FULL  Dispo: MICU   42 yo M with no significant medical history present with acute onset back pain radiating to chest, s/p rule out of aortic dissection per CTA, now with concern for upper GI bleed, suspecting gastric ulcers      NEURO  No active issues    CVS  No active issues    PULM  No active issues    RENAL  No active issues    ENDO  No active issues    GI  #Upper GI bleed  Pt with acute onset of back pain radiating to chest, aortic dissection ruled out. Prior to discharge, episode of hematemesis of approx. 500cc dark red blood and black tarry stools associated with upper abd pain  Pt reports using 4 tabs of "painkiller" in the past 24h, unclear if NSAIDs or any AC.   Pt denies any smoking or alcohol consumption, history of liver disease, peptic ulcers.   Pt reports having an episode of hematemesis in the past, and being scope, but doesn't remember the details.   concern for upper GI bleed 2/2 gastric ulcers 2/2 most likely H pylori, otherwise MHx unremarkable  - GI consulted, follow-up recs > EGD within 24h   - start octreotide gtt if variceal bleeding suspected  - c/w protonix gtt   - keep NPO    ID  No active issues    HEME  #GI bleed with drop in Hgb  Episode of hematemesis of approx. 500cc dark red blood. Hgb drop from 14.4 to 12.2 to 10.7  No additional episodes at [facility]  - c/w CBC Q4H  - transfuse if Hgb<8    PROPHYLAXIS  F: LR at 75cc/h  E: Replete PRN  Diet: NPO  DVT PPx: None  GI PPx: PPI gtt  Code: FULL  Dispo: MICU   42 yo M with no significant medical history present with episode of hematemesis and black tarry stools, HD stable, concern for upper GI bleed likely 2/2 gastric ulcers, pending endoscopy      NEURO  No active issues    CVS  No active issues    PULM  No active issues    RENAL  No active issues    ENDO  No active issues    GI  #Upper GI bleed  Pt with acute onset of back pain radiating to chest, aortic dissection ruled out. Prior to discharge, episode of hematemesis of approx. 500cc dark red blood and black tarry stools associated with upper abd pain  Pt reports using 4 tabs of "painkiller" in the past 24h, unclear if NSAIDs or any AC.   Pt denies any smoking or alcohol consumption, history of liver disease, peptic ulcers.   Pt reports having an episode of hematemesis in the past, and being scope, but doesn't remember the details.   Physical exam benign, Hgb drop from 14.4 to 10.7 but BUN/Cr ratio at 16-17 (should be elevated in upper GI bleed)  concern for upper GI bleed 2/2 gastric ulcers 2/2 most likely H pylori, otherwise MHx unremarkable  - GI consulted, follow-up recs > EGD within 24h   - no need for octreotide since low concern for variceal bleeding  - c/w protonix gtt   - keep NPO    ID  No active issues    HEME  #GI bleed with drop in Hgb  Episode of hematemesis of approx. 500cc dark red blood. Hgb drop from 14.4 to 12.2 to 10.7 2/2 active bleeding with component of dilution 2/2 fluids  No additional episodes at [facility]  - c/w CBC Q4H close monitoring i/s/o acute drop of Hgb  - transfuse if Hgb<8 since active bleeding    PROPHYLAXIS  F: LR at 75cc/h  E: Replete PRN  Diet: NPO  DVT PPx: None  GI PPx: PPI gtt  Code: FULL  Dispo: MICU

## 2022-03-30 NOTE — PROGRESS NOTE ADULT - SUBJECTIVE AND OBJECTIVE BOX
**INCOMPLETE NOTE    OVERNIGHT EVENTS:    SUBJECTIVE:  Patient seen and examined at bedside.    Vital Signs Last 12 Hrs  T(F): 97.1 (03-31-22 @ 01:22), Max: 97.1 (03-31-22 @ 01:22)  HR: 55 (03-31-22 @ 06:00) (55 - 83)  BP: 101/59 (03-31-22 @ 06:00) (85/43 - 118/65)  BP(mean): 78 (03-31-22 @ 06:00) (57 - 91)  RR: 11 (03-31-22 @ 06:00) (8 - 31)  SpO2: 100% (03-31-22 @ 06:00) (98% - 100%)  I&O's Summary    30 Mar 2022 07:01  -  31 Mar 2022 07:00  --------------------------------------------------------  IN: 700 mL / OUT: 600 mL / NET: 100 mL        PHYSICAL EXAM:  Constitutional: NAD, comfortable in bed.  HEENT: NC/AT, PERRLA, EOMI, no conjunctival pallor or scleral icterus, MMM  Neck: Supple, no JVD  Respiratory: CTA B/L. No w/r/r.   Cardiovascular: RRR, normal S1 and S2, no m/r/g.   Gastrointestinal: +BS, soft NTND, no guarding or rebound tenderness, no palpable masses   Extremities: wwp; no cyanosis, clubbing or edema.   Vascular: Pulses equal and strong throughout.   Neurological: AAOx3, no CN deficits, strength and sensation intact throughout.   Skin: No gross skin abnormalities or rashes        LABS:                        10.9   7.89  )-----------( 260      ( 31 Mar 2022 05:51 )             33.4     03-31    141  |  110<H>  |  9   ----------------------------<  83  4.0   |  24  |  0.97    Ca    8.4      31 Mar 2022 05:51  Phos  2.3     03-31  Mg     2.2     03-31    TPro  5.5<L>  /  Alb  3.7  /  TBili  1.1  /  DBili  x   /  AST  22  /  ALT  17  /  AlkPhos  55  03-30    PT/INR - ( 29 Mar 2022 19:17 )   PT: 11.9 sec;   INR: 1.03          PTT - ( 29 Mar 2022 19:17 )  PTT:32.4 sec        RADIOLOGY & ADDITIONAL TESTS:    MEDICATIONS  (STANDING):  chlorhexidine 2% Cloths 1 Application(s) Topical <User Schedule>  influenza   Vaccine 0.5 milliLiter(s) IntraMuscular once  pantoprazole Infusion 8 mG/Hr (10 mL/Hr) IV Continuous <Continuous>  sucralfate suspension 1 Gram(s) Oral every 6 hours    MEDICATIONS  (PRN):   SUBJECTIVE:  Patient seen and examined at bedside.  No complaints, ROS negative    Vital Signs Last 12 Hrs  T(F): 97.1 (03-31-22 @ 01:22), Max: 97.1 (03-31-22 @ 01:22)  HR: 55 (03-31-22 @ 06:00) (55 - 83)  BP: 101/59 (03-31-22 @ 06:00) (85/43 - 118/65)  BP(mean): 78 (03-31-22 @ 06:00) (57 - 91)  RR: 11 (03-31-22 @ 06:00) (8 - 31)  SpO2: 100% (03-31-22 @ 06:00) (98% - 100%)  I&O's Summary    30 Mar 2022 07:01  -  31 Mar 2022 07:00  --------------------------------------------------------  IN: 700 mL / OUT: 600 mL / NET: 100 mL    PHYSICAL EXAM:  Constitutional: NAD, comfortable in bed.  HEENT: NC/AT, PERRLA, EOMI, no conjunctival pallor or scleral icterus, MMM  Neck: Supple, no JVD  Respiratory: CTA B/L. No w/r/r.   Cardiovascular: RRR, normal S1 and S2, no m/r/g.   Gastrointestinal: +BS, soft NTND, no guarding or rebound tenderness, no palpable masses   Extremities: wwp; no cyanosis, clubbing or edema.   Vascular: Pulses equal and strong throughout.   Neurological: AAOx3, no CN deficits, strength and sensation intact throughout.   Skin: No gross skin abnormalities or rashes    LABS:                        10.9   7.89  )-----------( 260      ( 31 Mar 2022 05:51 )             33.4     03-31    141  |  110<H>  |  9   ----------------------------<  83  4.0   |  24  |  0.97    Ca    8.4      31 Mar 2022 05:51  Phos  2.3     03-31  Mg     2.2     03-31    TPro  5.5<L>  /  Alb  3.7  /  TBili  1.1  /  DBili  x   /  AST  22  /  ALT  17  /  AlkPhos  55  03-30    PT/INR - ( 29 Mar 2022 19:17 )   PT: 11.9 sec;   INR: 1.03       PTT - ( 29 Mar 2022 19:17 )  PTT:32.4 sec    RADIOLOGY & ADDITIONAL TESTS:    MEDICATIONS  (STANDING):  chlorhexidine 2% Cloths 1 Application(s) Topical <User Schedule>  influenza   Vaccine 0.5 milliLiter(s) IntraMuscular once  pantoprazole Infusion 8 mG/Hr (10 mL/Hr) IV Continuous <Continuous>  sucralfate suspension 1 Gram(s) Oral every 6 hours    MEDICATIONS  (PRN):

## 2022-03-30 NOTE — PATIENT PROFILE ADULT - NSPROMEDSBROUGHTTOHOSP_GEN_A_NUR
Stool noted dark brown, slightly formed not liquid, cdiff dcd.  Not sent.  Will continue to monitor.   no

## 2022-03-31 VITALS
OXYGEN SATURATION: 99 % | SYSTOLIC BLOOD PRESSURE: 130 MMHG | HEART RATE: 85 BPM | TEMPERATURE: 98 F | DIASTOLIC BLOOD PRESSURE: 80 MMHG | RESPIRATION RATE: 19 BRPM

## 2022-03-31 DIAGNOSIS — Z29.9 ENCOUNTER FOR PROPHYLACTIC MEASURES, UNSPECIFIED: ICD-10-CM

## 2022-03-31 DIAGNOSIS — K92.2 GASTROINTESTINAL HEMORRHAGE, UNSPECIFIED: ICD-10-CM

## 2022-03-31 LAB
ANION GAP SERPL CALC-SCNC: 7 MMOL/L — SIGNIFICANT CHANGE UP (ref 5–17)
BASOPHILS # BLD AUTO: 0.03 K/UL — SIGNIFICANT CHANGE UP (ref 0–0.2)
BASOPHILS NFR BLD AUTO: 0.4 % — SIGNIFICANT CHANGE UP (ref 0–2)
BUN SERPL-MCNC: 9 MG/DL — SIGNIFICANT CHANGE UP (ref 7–23)
CALCIUM SERPL-MCNC: 8.4 MG/DL — SIGNIFICANT CHANGE UP (ref 8.4–10.5)
CHLORIDE SERPL-SCNC: 110 MMOL/L — HIGH (ref 96–108)
CO2 SERPL-SCNC: 24 MMOL/L — SIGNIFICANT CHANGE UP (ref 22–31)
CREAT SERPL-MCNC: 0.97 MG/DL — SIGNIFICANT CHANGE UP (ref 0.5–1.3)
EGFR: 99 ML/MIN/1.73M2 — SIGNIFICANT CHANGE UP
EOSINOPHIL # BLD AUTO: 0.43 K/UL — SIGNIFICANT CHANGE UP (ref 0–0.5)
EOSINOPHIL NFR BLD AUTO: 5.4 % — SIGNIFICANT CHANGE UP (ref 0–6)
GLUCOSE SERPL-MCNC: 83 MG/DL — SIGNIFICANT CHANGE UP (ref 70–99)
HCT VFR BLD CALC: 33.4 % — LOW (ref 39–50)
HGB BLD-MCNC: 10.9 G/DL — LOW (ref 13–17)
IMM GRANULOCYTES NFR BLD AUTO: 0.3 % — SIGNIFICANT CHANGE UP (ref 0–1.5)
LYMPHOCYTES # BLD AUTO: 3.17 K/UL — SIGNIFICANT CHANGE UP (ref 1–3.3)
LYMPHOCYTES # BLD AUTO: 40.2 % — SIGNIFICANT CHANGE UP (ref 13–44)
MAGNESIUM SERPL-MCNC: 2.2 MG/DL — SIGNIFICANT CHANGE UP (ref 1.6–2.6)
MCHC RBC-ENTMCNC: 30.1 PG — SIGNIFICANT CHANGE UP (ref 27–34)
MCHC RBC-ENTMCNC: 32.6 GM/DL — SIGNIFICANT CHANGE UP (ref 32–36)
MCV RBC AUTO: 92.3 FL — SIGNIFICANT CHANGE UP (ref 80–100)
MONOCYTES # BLD AUTO: 0.76 K/UL — SIGNIFICANT CHANGE UP (ref 0–0.9)
MONOCYTES NFR BLD AUTO: 9.6 % — SIGNIFICANT CHANGE UP (ref 2–14)
NEUTROPHILS # BLD AUTO: 3.48 K/UL — SIGNIFICANT CHANGE UP (ref 1.8–7.4)
NEUTROPHILS NFR BLD AUTO: 44.1 % — SIGNIFICANT CHANGE UP (ref 43–77)
NRBC # BLD: 0 /100 WBCS — SIGNIFICANT CHANGE UP (ref 0–0)
PHOSPHATE SERPL-MCNC: 2.3 MG/DL — LOW (ref 2.5–4.5)
PLATELET # BLD AUTO: 260 K/UL — SIGNIFICANT CHANGE UP (ref 150–400)
POTASSIUM SERPL-MCNC: 4 MMOL/L — SIGNIFICANT CHANGE UP (ref 3.5–5.3)
POTASSIUM SERPL-SCNC: 4 MMOL/L — SIGNIFICANT CHANGE UP (ref 3.5–5.3)
RBC # BLD: 3.62 M/UL — LOW (ref 4.2–5.8)
RBC # FLD: 13.3 % — SIGNIFICANT CHANGE UP (ref 10.3–14.5)
SODIUM SERPL-SCNC: 141 MMOL/L — SIGNIFICANT CHANGE UP (ref 135–145)
WBC # BLD: 7.89 K/UL — SIGNIFICANT CHANGE UP (ref 3.8–10.5)
WBC # FLD AUTO: 7.89 K/UL — SIGNIFICANT CHANGE UP (ref 3.8–10.5)

## 2022-03-31 PROCEDURE — 83605 ASSAY OF LACTIC ACID: CPT

## 2022-03-31 PROCEDURE — 71275 CT ANGIOGRAPHY CHEST: CPT

## 2022-03-31 PROCEDURE — 83690 ASSAY OF LIPASE: CPT

## 2022-03-31 PROCEDURE — 80048 BASIC METABOLIC PNL TOTAL CA: CPT

## 2022-03-31 PROCEDURE — 99232 SBSQ HOSP IP/OBS MODERATE 35: CPT

## 2022-03-31 PROCEDURE — 36415 COLL VENOUS BLD VENIPUNCTURE: CPT

## 2022-03-31 PROCEDURE — 86901 BLOOD TYPING SEROLOGIC RH(D): CPT

## 2022-03-31 PROCEDURE — 86900 BLOOD TYPING SEROLOGIC ABO: CPT

## 2022-03-31 PROCEDURE — 99285 EMERGENCY DEPT VISIT HI MDM: CPT | Mod: 25

## 2022-03-31 PROCEDURE — 84484 ASSAY OF TROPONIN QUANT: CPT

## 2022-03-31 PROCEDURE — 85025 COMPLETE CBC W/AUTO DIFF WBC: CPT

## 2022-03-31 PROCEDURE — 74174 CTA ABD&PLVS W/CONTRAST: CPT

## 2022-03-31 PROCEDURE — 85730 THROMBOPLASTIN TIME PARTIAL: CPT

## 2022-03-31 PROCEDURE — 99233 SBSQ HOSP IP/OBS HIGH 50: CPT | Mod: GC

## 2022-03-31 PROCEDURE — 85610 PROTHROMBIN TIME: CPT

## 2022-03-31 PROCEDURE — 85027 COMPLETE CBC AUTOMATED: CPT

## 2022-03-31 PROCEDURE — 96375 TX/PRO/DX INJ NEW DRUG ADDON: CPT

## 2022-03-31 PROCEDURE — 71045 X-RAY EXAM CHEST 1 VIEW: CPT

## 2022-03-31 PROCEDURE — 83880 ASSAY OF NATRIURETIC PEPTIDE: CPT

## 2022-03-31 PROCEDURE — 87635 SARS-COV-2 COVID-19 AMP PRB: CPT

## 2022-03-31 PROCEDURE — 86850 RBC ANTIBODY SCREEN: CPT

## 2022-03-31 PROCEDURE — 83735 ASSAY OF MAGNESIUM: CPT

## 2022-03-31 PROCEDURE — 80053 COMPREHEN METABOLIC PANEL: CPT

## 2022-03-31 PROCEDURE — 84100 ASSAY OF PHOSPHORUS: CPT

## 2022-03-31 PROCEDURE — 96374 THER/PROPH/DIAG INJ IV PUSH: CPT

## 2022-03-31 PROCEDURE — 82962 GLUCOSE BLOOD TEST: CPT

## 2022-03-31 RX ORDER — SODIUM,POTASSIUM PHOSPHATES 278-250MG
1 POWDER IN PACKET (EA) ORAL ONCE
Refills: 0 | Status: COMPLETED | OUTPATIENT
Start: 2022-03-31 | End: 2022-03-31

## 2022-03-31 RX ORDER — SUCRALFATE 1 G
1 TABLET ORAL
Qty: 120 | Refills: 0
Start: 2022-03-31 | End: 2022-04-29

## 2022-03-31 RX ORDER — SUCRALFATE 1 G
10 TABLET ORAL
Qty: 1200 | Refills: 0
Start: 2022-03-31 | End: 2022-04-29

## 2022-03-31 RX ORDER — PANTOPRAZOLE SODIUM 20 MG/1
1 TABLET, DELAYED RELEASE ORAL
Qty: 60 | Refills: 0
Start: 2022-03-31 | End: 2022-04-29

## 2022-03-31 RX ADMIN — CHLORHEXIDINE GLUCONATE 1 APPLICATION(S): 213 SOLUTION TOPICAL at 06:30

## 2022-03-31 RX ADMIN — PANTOPRAZOLE SODIUM 10 MG/HR: 20 TABLET, DELAYED RELEASE ORAL at 06:28

## 2022-03-31 RX ADMIN — Medication 1 GRAM(S): at 11:46

## 2022-03-31 RX ADMIN — Medication 1 GRAM(S): at 06:28

## 2022-03-31 RX ADMIN — Medication 1 PACKET(S): at 11:18

## 2022-03-31 RX ADMIN — Medication 62.5 MILLIMOLE(S): at 12:31

## 2022-03-31 NOTE — PROGRESS NOTE ADULT - PROBLEM SELECTOR PLAN 2
F: none  E: replete  N: diet, clear for now advance as tolerated  D: scds  GI: ppi gtt  Dispo: [facility]

## 2022-03-31 NOTE — CHART NOTE - NSCHARTNOTEFT_GEN_A_CORE
Patient requesting to leave against medical advice. He was advised that he had a bleed found on EGD for which we were giving IV protonix. He confirms that him leaving is against our medical advice and that by leaving, he can have a continous bleed that may make him progressively weak, dizzy, and possibly result in death. Patient confirms that he still wants to leave. Protonix and sucralfate sent to [facility] for him to .

## 2022-03-31 NOTE — PROGRESS NOTE ADULT - ASSESSMENT
41M w/o significant PMHx p/w x1 hematemesis & melena, HD stable, c/f UGI bleed likely 2/2 gastric ulcers seen on EGD, placed on PPI gtt    NEURO  # r/o Absence seizure  Difficult to arouse post extubation with nystagmus  - f/u vEEG x12hrs & neuro recs    CVS  No active issues    PULM  No active issues    RENAL  No active issues    ENDO  No active issues    GI  #Upper GI bleed  p/w acute onset of back pain radiating to chest, aortic dissection ruled out. hematemesis 500cc and melena. Pt reports using 4 tabs of "painkiller" in the past 24h, unclear if NSAIDs or any AC.   - EGD 3/30/22: A single cratered ulcer with visible vessel (Ruddy Class IIA) was found in the incisura of the stomach s/p injection w epinephrine and BICAP. Unsuccessful attempts with apposition of ulcer edges with hemoclip.   - no need for octreotide since low concern for variceal bleeding  - c/w protonix gtt x72h (until 4/3PM), then Protonix BID x2wks (IV while inpatient, PO on discharge), then PO Protonix Qd x6wks  - CLD, advance diet per GI    ID  No active issues    HEME  #GI bleed with drop in Hgb  Episode of hematemesis of approx. 500cc dark red blood. Hgb drop from 14.4 to 12.2 to 10.7 2/2 active bleeding with component of dilution 2/2 fluids  No additional episodes at [facility]  - CBC daily, transfuse if Hgb<8 since active bleeding    PROPHYLAXIS  F: LR at 75cc/h  E: Replete PRN  Diet: NPO  DVT PPx: None  GI PPx: PPI gtt  Code: FULL  Dispo: MICU   41M w/o significant PMHx p/w x1 hematemesis & melena, HD stable, c/f UGI bleed likely 2/2 gastric ulcers seen on EGD, placed on PPI gtt

## 2022-03-31 NOTE — PROGRESS NOTE ADULT - SUBJECTIVE AND OBJECTIVE BOX
Pt seen and examined at bedside.  Extubated and doing well.  Drinking CLD without any issue.  No BM per patient. Denies fever, chill, abdominal or epigastric pain, nausea, vomiting, hematemesis, diarrhea, constipation, melena, or hematochezia.     Allergies    No Known Allergies    Intolerances      MEDICATIONS:  MEDICATIONS  (STANDING):  chlorhexidine 2% Cloths 1 Application(s) Topical <User Schedule>  influenza   Vaccine 0.5 milliLiter(s) IntraMuscular once  pantoprazole Infusion 8 mG/Hr (10 mL/Hr) IV Continuous <Continuous>  potassium phosphate / sodium phosphate Powder (PHOS-NaK) 1 Packet(s) Oral once  sucralfate suspension 1 Gram(s) Oral every 6 hours    MEDICATIONS  (PRN):    Vital Signs Last 24 Hrs  T(C): 36.2 (31 Mar 2022 01:22), Max: 36.8 (30 Mar 2022 14:41)  T(F): 97.1 (31 Mar 2022 01:22), Max: 98.3 (30 Mar 2022 14:41)  HR: 86 (31 Mar 2022 08:00) (55 - 86)  BP: 138/89 (31 Mar 2022 08:00) (85/43 - 147/107)  BP(mean): 106 (31 Mar 2022 08:00) (57 - 122)  RR: 26 (31 Mar 2022 08:00) (8 - 31)  SpO2: 100% (31 Mar 2022 08:00) (98% - 100%)    03-30 @ 07:01  -  03-31 @ 07:00  --------------------------------------------------------  IN: 700 mL / OUT: 600 mL / NET: 100 mL    03-31 @ 07:01  -  03-31 @ 08:32  --------------------------------------------------------  IN: 10 mL / OUT: 0 mL / NET: 10 mL      PHYSICAL EXAM:    General: Comfortable in bed, in no acute distress  HEENT: MMM, conjunctiva and sclera clear  Gastrointestinal: Soft non-tender non-distended; No rebound or guarding  Skin: Warm and dry    LABS:                        10.9   7.89  )-----------( 260      ( 31 Mar 2022 05:51 )             33.4     03-31    141  |  110<H>  |  9   ----------------------------<  83  4.0   |  24  |  0.97    Ca    8.4      31 Mar 2022 05:51  Phos  2.3     03-31  Mg     2.2     03-31    TPro  5.5<L>  /  Alb  3.7  /  TBili  1.1  /  DBili  x   /  AST  22  /  ALT  17  /  AlkPhos  55  03-30    PT/INR - ( 29 Mar 2022 19:17 )   PT: 11.9 sec;   INR: 1.03          PTT - ( 29 Mar 2022 19:17 )  PTT:32.4 sec    RADIOLOGY & ADDITIONAL STUDIES: Reviewed

## 2022-03-31 NOTE — PROGRESS NOTE ADULT - PROBLEM SELECTOR PLAN 3
neuro consulted for absense peroids after coming off sedation after endoscopy. Seems to have resolved now    neuro believes due to sedation  vEEG ordered

## 2022-03-31 NOTE — PROGRESS NOTE ADULT - PROBLEM SELECTOR PLAN 1
Patient presented with abd pain that radiated to the back.  He was seen with hematemesis in the ED.  He reports recent painkiller use, unclear what medication.  Denies use of A/C.  Patient was admitted to the ICU.    -EGD 3/30/22: A single cratered ulcer with visible vessel (Ruddy Class IIA) was found in the incisura of the stomach s/p injection w epinephrine and BICAP. Unsuccessful attempts with apposition of ulcer edges with hemoclip.   -started on PPI gtt on 3/30 to continue for 72 hours  -then transition to PPI BID for 2 weeks  - then PPI daily for 6 weeks  -maintain active t&s

## 2022-03-31 NOTE — PROGRESS NOTE ADULT - ASSESSMENT
40 yo M with no significant medical history present with episode of hematemesis and black tarry stools, HD stable, concern for upper GI bleed likely 2/2 gastric ulcers, pending endoscopy      NEURO  No active issues    CVS  No active issues    PULM  No active issues    RENAL  No active issues    ENDO  No active issues    GI  #Upper GI bleed  Pt with acute onset of back pain radiating to chest, aortic dissection ruled out. Prior to discharge, episode of hematemesis of approx. 500cc dark red blood and black tarry stools associated with upper abd pain  Pt reports using 4 tabs of "painkiller" in the past 24h, unclear if NSAIDs or any AC.   Pt denies any smoking or alcohol consumption, history of liver disease, peptic ulcers.   Pt reports having an episode of hematemesis in the past, and being scope, but doesn't remember the details.   Physical exam benign, Hgb drop from 14.4 to 10.7 but BUN/Cr ratio at 16-17 (should be elevated in upper GI bleed)  concern for upper GI bleed 2/2 gastric ulcers 2/2 most likely H pylori, otherwise MHx unremarkable  - GI consulted, follow-up recs > EGD within 24h   - no need for octreotide since low concern for variceal bleeding  - c/w protonix gtt   - keep NPO    ID  No active issues    HEME  #GI bleed with drop in Hgb  Episode of hematemesis of approx. 500cc dark red blood. Hgb drop from 14.4 to 12.2 to 10.7 2/2 active bleeding with component of dilution 2/2 fluids  No additional episodes at [facility]  - c/w CBC Q4H close monitoring i/s/o acute drop of Hgb  - transfuse if Hgb<8 since active bleeding    PROPHYLAXIS  F: LR at 75cc/h  E: Replete PRN  Diet: NPO  DVT PPx: None  GI PPx: PPI gtt  Code: FULL  Dispo: MICU   41M w/o significant PMHx p/w x1 hematemesis & melena, HD stable, c/f UGI bleed likely 2/2 gastric ulcers seen on EGD, placed on PPI gtt    NEURO  # r/o Seizure  Difficult to arouse post extubation with nystagmus  - f/u vEEG x12hrs & neuro recs    CVS  No active issues    PULM  No active issues    RENAL  No active issues    ENDO  No active issues    GI  #Upper GI bleed  Pt with acute onset of back pain radiating to chest, aortic dissection ruled out. Prior to discharge, episode of hematemesis of approx. 500cc dark red blood and black tarry stools associated with upper abd pain  Pt reports using 4 tabs of "painkiller" in the past 24h, unclear if NSAIDs or any AC.   Pt denies any smoking or alcohol consumption, history of liver disease, peptic ulcers.   Pt reports having an episode of hematemesis in the past, and being scope, but doesn't remember the details.   Physical exam benign, Hgb drop from 14.4 to 10.7 but BUN/Cr ratio at 16-17 (should be elevated in upper GI bleed)  concern for upper GI bleed 2/2 gastric ulcers 2/2 most likely H pylori, otherwise MHx unremarkable  - GI consulted, follow-up recs > EGD within 24h   - no need for octreotide since low concern for variceal bleeding  - c/w protonix gtt   - keep NPO    ID  No active issues    HEME  #GI bleed with drop in Hgb  Episode of hematemesis of approx. 500cc dark red blood. Hgb drop from 14.4 to 12.2 to 10.7 2/2 active bleeding with component of dilution 2/2 fluids  No additional episodes at [facility]  - c/w CBC Q4H close monitoring i/s/o acute drop of Hgb  - transfuse if Hgb<8 since active bleeding    PROPHYLAXIS  F: LR at 75cc/h  E: Replete PRN  Diet: NPO  DVT PPx: None  GI PPx: PPI gtt  Code: FULL  Dispo: MICU   41M w/o significant PMHx p/w x1 hematemesis & melena, HD stable, c/f UGI bleed likely 2/2 gastric ulcers seen on EGD, placed on PPI gtt    NEURO  # r/o Seizure  Difficult to arouse post extubation with nystagmus  - f/u vEEG x12hrs & neuro recs    CVS  No active issues    PULM  No active issues    RENAL  No active issues    ENDO  No active issues    GI  #Upper GI bleed  p/w acute onset of back pain radiating to chest, aortic dissection ruled out. hematemesis 500cc and melena.  Pt reports using 4 tabs of "painkiller" in the past 24h, unclear if NSAIDs or any AC.   Pt denies any smoking or alcohol consumption, history of liver disease, peptic ulcers.   Pt reports having an episode of hematemesis in the past, and being scope, but doesn't remember the details.   Physical exam benign, Hgb drop from 14.4 to 10.7 but BUN/Cr ratio at 16-17 (should be elevated in upper GI bleed)  concern for upper GI bleed 2/2 gastric ulcers 2/2 most likely H pylori, otherwise MHx unremarkable  - GI consulted, follow-up recs > EGD within 24h   - no need for octreotide since low concern for variceal bleeding  - c/w protonix gtt   - keep NPO    ID  No active issues    HEME  #GI bleed with drop in Hgb  Episode of hematemesis of approx. 500cc dark red blood. Hgb drop from 14.4 to 12.2 to 10.7 2/2 active bleeding with component of dilution 2/2 fluids  No additional episodes at [facility]  - CBC daily, transfuse if Hgb<8 since active bleeding    PROPHYLAXIS  F: LR at 75cc/h  E: Replete PRN  Diet: NPO  DVT PPx: None  GI PPx: PPI gtt  Code: FULL  Dispo: MICU   41M w/o significant PMHx p/w x1 hematemesis & melena, HD stable, c/f UGI bleed likely 2/2 gastric ulcers seen on EGD, placed on PPI gtt    NEURO  # r/o Absence seizure  Difficult to arouse post extubation with nystagmus  - f/u vEEG x12hrs & neuro recs    CVS  No active issues    PULM  No active issues    RENAL  No active issues    ENDO  No active issues    GI  #Upper GI bleed  p/w acute onset of back pain radiating to chest, aortic dissection ruled out. hematemesis 500cc and melena. Pt reports using 4 tabs of "painkiller" in the past 24h, unclear if NSAIDs or any AC.   - EGD 3/30/22: A single cratered ulcer with visible vessel (Ruddy Class IIA) was found in the incisura of the stomach s/p injection w epinephrine and BICAP. Unsuccessful attempts with apposition of ulcer edges with hemoclip.   - no need for octreotide since low concern for variceal bleeding  - c/w protonix gtt x72h (until 4/3PM), then Protonix BID x2wks (IV while inpatient, PO on discharge), then PO Protonix Qd x6wks  - CLD, advance diet per GI    ID  No active issues    HEME  #GI bleed with drop in Hgb  Episode of hematemesis of approx. 500cc dark red blood. Hgb drop from 14.4 to 12.2 to 10.7 2/2 active bleeding with component of dilution 2/2 fluids  No additional episodes at [facility]  - CBC daily, transfuse if Hgb<8 since active bleeding    PROPHYLAXIS  F: LR at 75cc/h  E: Replete PRN  Diet: NPO  DVT PPx: None  GI PPx: PPI gtt  Code: FULL  Dispo: MICU

## 2022-03-31 NOTE — PROGRESS NOTE ADULT - ATTENDING COMMENTS
Acute blood loss anemia secondary to hematemesis, h/o "pain killer" medication use   s/p emergent EGD - Incisural ulcer in stomach with adherent clot, s/p dual intervention (epinephrine and bipolar probe) to prevent recurrent bleed.  Post procedure no recurrence, Hb stable   Recommend PPI IV for 72 hours and Carafate followed by PPI on discharge   Patient will need repeat EGD as outpatient to assess for ulcer healing   Rest plan as per fellow note.
UGI bleed with duodenal ulcer with acute anemia. Patient was intubated today. UGI endoscopy done. Patient remains on protonix drip. Rest as above
UGI bleed with duodenal ulcer with acute anemia h/h stable. on protonix drip. No episode of UGI bleed overnight. Transfer to floor.
Pt with no sig PMH admitted presented with  hematemesis & melena and admitted to MICU for evaluation of upper GI bleed. Pt underwent endoscopy by GI which showed  gastric ulcers and placed on PPI gtt. Pt was transferred to [facility] on being stable  Pt denies any new complaints on coming to floor    Acute Blood loss due to gastric ulcer bleed  BMI 35.9  Obesity  Undomiciled    Plan  cont ppi dripx72 hours   Monitor Hb/hct and transfuse as needed  GI f/u  SW for shelter

## 2022-03-31 NOTE — PROGRESS NOTE ADULT - ASSESSMENT
42 yo M w/ no significant PMHx presents with back pain and hematemesis.      #Hematemesis  Patient presented with abd pain that readiated to the back.  He was seen with hematemesis in the ED.  He reports recent painkiller use, unclear what medication.  Denies use of A/C.  Patient was admitted to the ICU.    -EGD 3/30/22: A single cratered ulcer with visible vessel (Ruddy Class IIA) was found in the incisura of the stomach s/p injection w epinephrine and BICAP. Unsuccessful attempts with apposition of ulcer edges with hemoclip.   -Repeat EGD in 8 weeks  -PPI gtt for 72 hours before transition to PPI BID for 2 weeks, then PPI daily for 6 weeks  -Trend CBC  -Maintain active T&S and large bore IV  -Transfusion goal per primary team  -Please notify GI team if patient develops overt GI bleeding or change in hemodynamics  -Outpatient GI followup at discharge    GI will sign off, please reconsult as needed

## 2022-03-31 NOTE — PROGRESS NOTE ADULT - SUBJECTIVE AND OBJECTIVE BOX
CC: Patient is a 41y old  Male who presents with a chief complaint of hematemesis (31 Mar 2022 11:48)      INTERVAL EVENTS: DOMENICA    SUBJECTIVE / INTERVAL HPI: Patient seen and examined at bedside.     ROS: negative unless otherwise stated above.    VITAL SIGNS:  Vital Signs Last 24 Hrs  T(C): 36.8 (31 Mar 2022 09:54), Max: 36.8 (30 Mar 2022 14:41)  T(F): 98.3 (31 Mar 2022 09:54), Max: 98.3 (30 Mar 2022 14:41)  HR: 78 (31 Mar 2022 10:00) (55 - 86)  BP: 122/74 (31 Mar 2022 10:00) (85/43 - 147/107)  BP(mean): 93 (31 Mar 2022 10:00) (57 - 122)  RR: 22 (31 Mar 2022 10:00) (8 - 31)  SpO2: 100% (31 Mar 2022 10:00) (98% - 100%)      03-30-22 @ 07:01  -  03-31-22 @ 07:00  --------------------------------------------------------  IN: 700 mL / OUT: 600 mL / NET: 100 mL    03-31-22 @ 07:01  -  03-31-22 @ 11:55  --------------------------------------------------------  IN: 10 mL / OUT: 0 mL / NET: 10 mL        PHYSICAL EXAM:    General: NAD  HEENT: MMM  Neck: supple  Cardiovascular: +S1/S2; RRR  Respiratory: CTA B/L; no W/R/R  Gastrointestinal: soft, NT/ND  Extremities: WWP; no edema, clubbing or cyanosis  Vascular: 2+ radial, DP/PT pulses B/L  Neurological: AAOx3; no focal deficits    MEDICATIONS:  MEDICATIONS  (STANDING):  chlorhexidine 2% Cloths 1 Application(s) Topical <User Schedule>  influenza   Vaccine 0.5 milliLiter(s) IntraMuscular once  pantoprazole Infusion 8 mG/Hr (10 mL/Hr) IV Continuous <Continuous>  sodium phosphate IVPB 15 milliMole(s) IV Intermittent once  sucralfate suspension 1 Gram(s) Oral every 6 hours    MEDICATIONS  (PRN):      ALLERGIES:  Allergies    No Known Allergies    Intolerances        LABS:                        10.9   7.89  )-----------( 260      ( 31 Mar 2022 05:51 )             33.4     03-31    141  |  110<H>  |  9   ----------------------------<  83  4.0   |  24  |  0.97    Ca    8.4      31 Mar 2022 05:51  Phos  2.3     03-31  Mg     2.2     03-31    TPro  5.5<L>  /  Alb  3.7  /  TBili  1.1  /  DBili  x   /  AST  22  /  ALT  17  /  AlkPhos  55  03-30    PT/INR - ( 29 Mar 2022 19:17 )   PT: 11.9 sec;   INR: 1.03          PTT - ( 29 Mar 2022 19:17 )  PTT:32.4 sec    CAPILLARY BLOOD GLUCOSE      POCT Blood Glucose.: 96 mg/dL (30 Mar 2022 14:48)      RADIOLOGY & ADDITIONAL TESTS: Reviewed. ***Step down from MICU to [facility]***  Hospital course:  41M questionable hx of NSAID use, w/o significant PMHx p/w back pain radiating to sternum with UE BP discrepancy c/f aortic dissection. Chest CT wnl, but while in [facility] ED had x1 500cc hematemesis & melena c/f UGIB. Hgb dropped 14.4 to 12.2 to 10.7, which stabilised. Transferred to [facility] MICU for monitoring and EGD with planned intubation. EGD showed a single cratered ulcer with visible vessel (Ruddy Class IIA) was found in the incisura of the stomach s/p injection w epinephrine and BICAP. Unsuccessful attempts with apposition of ulcer edges with hemoclip. On extubation he was drowsy and not responding to painful stimuli, the horizontal nystagmus c/f absence seizure. Neurology was consulted and vEEG workup is pending, however no further episodes of drowsiness today. No further episodes of hematemesis or melena. Pt to continue on PPI gtt for 72 hours and advance diet. Pt is stable to step down to F.    CC: Patient is a 41y old  Male who presents with a chief complaint of hematemesis (31 Mar 2022 11:48)    SUBJECTIVE / INTERVAL HPI: Patient seen and examined at bedside.     ROS: negative unless otherwise stated above.    VITAL SIGNS:  Vital Signs Last 24 Hrs  T(C): 36.8 (31 Mar 2022 09:54), Max: 36.8 (30 Mar 2022 14:41)  T(F): 98.3 (31 Mar 2022 09:54), Max: 98.3 (30 Mar 2022 14:41)  HR: 78 (31 Mar 2022 10:00) (55 - 86)  BP: 122/74 (31 Mar 2022 10:00) (85/43 - 147/107)  BP(mean): 93 (31 Mar 2022 10:00) (57 - 122)  RR: 22 (31 Mar 2022 10:00) (8 - 31)  SpO2: 100% (31 Mar 2022 10:00) (98% - 100%)      03-30-22 @ 07:01  -  03-31-22 @ 07:00  --------------------------------------------------------  IN: 700 mL / OUT: 600 mL / NET: 100 mL    03-31-22 @ 07:01  -  03-31-22 @ 11:55  --------------------------------------------------------  IN: 10 mL / OUT: 0 mL / NET: 10 mL        PHYSICAL EXAM:    General: NAD  HEENT: MMM  Neck: supple  Cardiovascular: +S1/S2; RRR  Respiratory: CTA B/L; no W/R/R  Gastrointestinal: soft, NT/ND  Extremities: WWP; no edema, clubbing or cyanosis  Vascular: 2+ radial, DP/PT pulses B/L  Neurological: AAOx3; no focal deficits    MEDICATIONS:  MEDICATIONS  (STANDING):  chlorhexidine 2% Cloths 1 Application(s) Topical <User Schedule>  influenza   Vaccine 0.5 milliLiter(s) IntraMuscular once  pantoprazole Infusion 8 mG/Hr (10 mL/Hr) IV Continuous <Continuous>  sodium phosphate IVPB 15 milliMole(s) IV Intermittent once  sucralfate suspension 1 Gram(s) Oral every 6 hours    MEDICATIONS  (PRN):      ALLERGIES:  Allergies    No Known Allergies    Intolerances        LABS:                        10.9   7.89  )-----------( 260      ( 31 Mar 2022 05:51 )             33.4     03-31    141  |  110<H>  |  9   ----------------------------<  83  4.0   |  24  |  0.97    Ca    8.4      31 Mar 2022 05:51  Phos  2.3     03-31  Mg     2.2     03-31    TPro  5.5<L>  /  Alb  3.7  /  TBili  1.1  /  DBili  x   /  AST  22  /  ALT  17  /  AlkPhos  55  03-30    PT/INR - ( 29 Mar 2022 19:17 )   PT: 11.9 sec;   INR: 1.03          PTT - ( 29 Mar 2022 19:17 )  PTT:32.4 sec    CAPILLARY BLOOD GLUCOSE      POCT Blood Glucose.: 96 mg/dL (30 Mar 2022 14:48)      RADIOLOGY & ADDITIONAL TESTS: Reviewed.

## 2022-03-31 NOTE — PROGRESS NOTE ADULT - SUBJECTIVE AND OBJECTIVE BOX
**INCOMPLETE NOTE    OVERNIGHT EVENTS:    SUBJECTIVE:  Patient seen and examined at bedside.    Vital Signs Last 12 Hrs  T(F): 97.1 (03-31-22 @ 01:22), Max: 97.6 (03-30-22 @ 18:47)  HR: 66 (03-31-22 @ 04:00) (64 - 83)  BP: 100/55 (03-31-22 @ 04:00) (96/55 - 134/80)  BP(mean): 72 (03-31-22 @ 04:00) (70 - 101)  RR: 31 (03-31-22 @ 04:00) (8 - 31)  SpO2: 99% (03-31-22 @ 04:00) (98% - 100%)  I&O's Summary    29 Mar 2022 07:01  -  30 Mar 2022 07:00  --------------------------------------------------------  IN: 720 mL / OUT: 0 mL / NET: 720 mL    30 Mar 2022 07:01  -  31 Mar 2022 06:45  --------------------------------------------------------  IN: 590 mL / OUT: 600 mL / NET: -10 mL        PHYSICAL EXAM:  Constitutional: NAD, comfortable in bed.  HEENT: NC/AT, PERRLA, EOMI, no conjunctival pallor or scleral icterus, MMM  Neck: Supple, no JVD  Respiratory: CTA B/L. No w/r/r.   Cardiovascular: RRR, normal S1 and S2, no m/r/g.   Gastrointestinal: +BS, soft NTND, no guarding or rebound tenderness, no palpable masses   Extremities: wwp; no cyanosis, clubbing or edema.   Vascular: Pulses equal and strong throughout.   Neurological: AAOx3, no CN deficits, strength and sensation intact throughout.   Skin: No gross skin abnormalities or rashes        LABS:                        10.9   7.89  )-----------( 260      ( 31 Mar 2022 05:51 )             33.4     03-31    141  |  110<H>  |  9   ----------------------------<  83  4.0   |  24  |  0.97    Ca    8.4      31 Mar 2022 05:51  Phos  2.3     03-31  Mg     2.2     03-31    TPro  5.5<L>  /  Alb  3.7  /  TBili  1.1  /  DBili  x   /  AST  22  /  ALT  17  /  AlkPhos  55  03-30    PT/INR - ( 29 Mar 2022 19:17 )   PT: 11.9 sec;   INR: 1.03          PTT - ( 29 Mar 2022 19:17 )  PTT:32.4 sec        RADIOLOGY & ADDITIONAL TESTS:    MEDICATIONS  (STANDING):  chlorhexidine 2% Cloths 1 Application(s) Topical <User Schedule>  influenza   Vaccine 0.5 milliLiter(s) IntraMuscular once  pantoprazole Infusion 8 mG/Hr (10 mL/Hr) IV Continuous <Continuous>  sucralfate suspension 1 Gram(s) Oral every 6 hours    MEDICATIONS  (PRN):   OVERNIGHT EVENTS: given erythrmoycin as prep for scope, electively intubated, s/p EGD. Episdoes of non-responsiveness with full recovery of AO status in between episodes. Started vEEG, hold off CTH for now, per neuro.  o/nL daryl    SUBJECTIVE:  Patient seen and examined at bedside.  No complaints, ROS negative    Vital Signs Last 12 Hrs  T(F): 97.1 (03-31-22 @ 01:22), Max: 97.6 (03-30-22 @ 18:47)  HR: 66 (03-31-22 @ 04:00) (64 - 83)  BP: 100/55 (03-31-22 @ 04:00) (96/55 - 134/80)  BP(mean): 72 (03-31-22 @ 04:00) (70 - 101)  RR: 31 (03-31-22 @ 04:00) (8 - 31)  SpO2: 99% (03-31-22 @ 04:00) (98% - 100%)  I&O's Summary    29 Mar 2022 07:01  -  30 Mar 2022 07:00  --------------------------------------------------------  IN: 720 mL / OUT: 0 mL / NET: 720 mL    30 Mar 2022 07:01  -  31 Mar 2022 06:45  --------------------------------------------------------  IN: 590 mL / OUT: 600 mL / NET: -10 mL    PHYSICAL EXAM:  Constitutional: NAD, comfortable in bed.  HEENT: NC/AT, PERRLA, EOMI, no conjunctival pallor or scleral icterus, MMM  Neck: Supple, no JVD  Respiratory: CTA B/L. No w/r/r.   Cardiovascular: RRR, normal S1 and S2, no m/r/g.   Gastrointestinal: +BS, soft NTND, no guarding or rebound tenderness, no palpable masses   Extremities: wwp; no cyanosis, clubbing or edema.   Vascular: Pulses equal and strong throughout.   Neurological: AAOx3, no CN deficits, strength and sensation intact throughout.   Skin: No gross skin abnormalities or rashes    LABS:                        10.9   7.89  )-----------( 260      ( 31 Mar 2022 05:51 )             33.4     03-31    141  |  110<H>  |  9   ----------------------------<  83  4.0   |  24  |  0.97    Ca    8.4      31 Mar 2022 05:51  Phos  2.3     03-31  Mg     2.2     03-31    TPro  5.5<L>  /  Alb  3.7  /  TBili  1.1  /  DBili  x   /  AST  22  /  ALT  17  /  AlkPhos  55  03-30    PT/INR - ( 29 Mar 2022 19:17 )   PT: 11.9 sec;   INR: 1.03       PTT - ( 29 Mar 2022 19:17 )  PTT:32.4 sec    RADIOLOGY & ADDITIONAL TESTS:    MEDICATIONS  (STANDING):  chlorhexidine 2% Cloths 1 Application(s) Topical <User Schedule>  influenza   Vaccine 0.5 milliLiter(s) IntraMuscular once  pantoprazole Infusion 8 mG/Hr (10 mL/Hr) IV Continuous <Continuous>  sucralfate suspension 1 Gram(s) Oral every 6 hours    MEDICATIONS  (PRN):   OVERNIGHT EVENTS:    SUBJECTIVE:  Patient seen and examined at bedside.  No complaints, ROS negative    Vital Signs Last 12 Hrs  T(F): 97.1 (03-31-22 @ 01:22), Max: 97.6 (03-30-22 @ 18:47)  HR: 66 (03-31-22 @ 04:00) (64 - 83)  BP: 100/55 (03-31-22 @ 04:00) (96/55 - 134/80)  BP(mean): 72 (03-31-22 @ 04:00) (70 - 101)  RR: 31 (03-31-22 @ 04:00) (8 - 31)  SpO2: 99% (03-31-22 @ 04:00) (98% - 100%)  I&O's Summary    29 Mar 2022 07:01  -  30 Mar 2022 07:00  --------------------------------------------------------  IN: 720 mL / OUT: 0 mL / NET: 720 mL    30 Mar 2022 07:01  -  31 Mar 2022 06:45  --------------------------------------------------------  IN: 590 mL / OUT: 600 mL / NET: -10 mL    PHYSICAL EXAM:  Constitutional: NAD, comfortable in bed.  HEENT: NC/AT, PERRLA, EOMI, no conjunctival pallor or scleral icterus, MMM  Neck: Supple, no JVD  Respiratory: CTA B/L. No w/r/r.   Cardiovascular: RRR, normal S1 and S2, no m/r/g.   Gastrointestinal: +BS, soft NTND, no guarding or rebound tenderness, no palpable masses   Extremities: wwp; no cyanosis, clubbing or edema.   Vascular: Pulses equal and strong throughout.   Neurological: AAOx3, no CN deficits, strength and sensation intact throughout.   Skin: No gross skin abnormalities or rashes    LABS:                        10.9   7.89  )-----------( 260      ( 31 Mar 2022 05:51 )             33.4     03-31    141  |  110<H>  |  9   ----------------------------<  83  4.0   |  24  |  0.97    Ca    8.4      31 Mar 2022 05:51  Phos  2.3     03-31  Mg     2.2     03-31    TPro  5.5<L>  /  Alb  3.7  /  TBili  1.1  /  DBili  x   /  AST  22  /  ALT  17  /  AlkPhos  55  03-30    PT/INR - ( 29 Mar 2022 19:17 )   PT: 11.9 sec;   INR: 1.03       PTT - ( 29 Mar 2022 19:17 )  PTT:32.4 sec    RADIOLOGY & ADDITIONAL TESTS:    MEDICATIONS  (STANDING):  chlorhexidine 2% Cloths 1 Application(s) Topical <User Schedule>  influenza   Vaccine 0.5 milliLiter(s) IntraMuscular once  pantoprazole Infusion 8 mG/Hr (10 mL/Hr) IV Continuous <Continuous>  sucralfate suspension 1 Gram(s) Oral every 6 hours    MEDICATIONS  (PRN):   OVERNIGHT EVENTS: DOMENICA    SUBJECTIVE:  Patient seen and examined at bedside.  No complaints, ROS negative    Vital Signs Last 12 Hrs  T(F): 97.1 (03-31-22 @ 01:22), Max: 97.6 (03-30-22 @ 18:47)  HR: 66 (03-31-22 @ 04:00) (64 - 83)  BP: 100/55 (03-31-22 @ 04:00) (96/55 - 134/80)  BP(mean): 72 (03-31-22 @ 04:00) (70 - 101)  RR: 31 (03-31-22 @ 04:00) (8 - 31)  SpO2: 99% (03-31-22 @ 04:00) (98% - 100%)  I&O's Summary    29 Mar 2022 07:01  -  30 Mar 2022 07:00  --------------------------------------------------------  IN: 720 mL / OUT: 0 mL / NET: 720 mL    30 Mar 2022 07:01  -  31 Mar 2022 06:45  --------------------------------------------------------  IN: 590 mL / OUT: 600 mL / NET: -10 mL    PHYSICAL EXAM:  Constitutional: NAD, comfortable in bed.  HEENT: NC/AT, PERRLA, EOMI, no conjunctival pallor or scleral icterus, MMM  Neck: Supple, no JVD  Respiratory: CTA B/L. No w/r/r.   Cardiovascular: RRR, normal S1 and S2, no m/r/g.   Gastrointestinal: +BS, soft NTND, no guarding or rebound tenderness, no palpable masses   Extremities: wwp; no cyanosis, clubbing or edema.   Vascular: Pulses equal and strong throughout.   Neurological: AAOx3, no CN deficits, strength and sensation intact throughout.   Skin: No gross skin abnormalities or rashes    LABS:                        10.9   7.89  )-----------( 260      ( 31 Mar 2022 05:51 )             33.4     03-31    141  |  110<H>  |  9   ----------------------------<  83  4.0   |  24  |  0.97    Ca    8.4      31 Mar 2022 05:51  Phos  2.3     03-31  Mg     2.2     03-31    TPro  5.5<L>  /  Alb  3.7  /  TBili  1.1  /  DBili  x   /  AST  22  /  ALT  17  /  AlkPhos  55  03-30    PT/INR - ( 29 Mar 2022 19:17 )   PT: 11.9 sec;   INR: 1.03       PTT - ( 29 Mar 2022 19:17 )  PTT:32.4 sec    RADIOLOGY & ADDITIONAL TESTS:    MEDICATIONS  (STANDING):  chlorhexidine 2% Cloths 1 Application(s) Topical <User Schedule>  influenza   Vaccine 0.5 milliLiter(s) IntraMuscular once  pantoprazole Infusion 8 mG/Hr (10 mL/Hr) IV Continuous <Continuous>  sucralfate suspension 1 Gram(s) Oral every 6 hours    MEDICATIONS  (PRN):   ***Step down from MICU to [facility]***  Hospital course:  41M w/o significant PMHx p/w back pain radiating to sternum with UE BP discrepancy c/f aortic dissection. Chest CT wnl, but while in [facility] ED had x1 500cc hematemesis & melena c/f UGIB. Hgb dropped 14.4 to 12.2 to 10.7, which stabilised. Transferred to [facility] MICU for monitoring and EGD with planned intubation. On     OVERNIGHT EVENTS: DOMENICA    SUBJECTIVE:  Patient seen and examined at bedside.  No complaints, ROS negative    Vital Signs Last 12 Hrs  T(F): 97.1 (03-31-22 @ 01:22), Max: 97.6 (03-30-22 @ 18:47)  HR: 66 (03-31-22 @ 04:00) (64 - 83)  BP: 100/55 (03-31-22 @ 04:00) (96/55 - 134/80)  BP(mean): 72 (03-31-22 @ 04:00) (70 - 101)  RR: 31 (03-31-22 @ 04:00) (8 - 31)  SpO2: 99% (03-31-22 @ 04:00) (98% - 100%)  I&O's Summary    29 Mar 2022 07:01  -  30 Mar 2022 07:00  --------------------------------------------------------  IN: 720 mL / OUT: 0 mL / NET: 720 mL    30 Mar 2022 07:01  -  31 Mar 2022 06:45  --------------------------------------------------------  IN: 590 mL / OUT: 600 mL / NET: -10 mL    PHYSICAL EXAM:  Constitutional: NAD, comfortable in bed.  HEENT: NC/AT, PERRLA, EOMI, no conjunctival pallor or scleral icterus, MMM  Neck: Supple, no JVD  Respiratory: CTA B/L. No w/r/r.   Cardiovascular: RRR, normal S1 and S2, no m/r/g.   Gastrointestinal: +BS, soft NTND, no guarding or rebound tenderness, no palpable masses   Extremities: wwp; no cyanosis, clubbing or edema.   Vascular: Pulses equal and strong throughout.   Neurological: AAOx3, no CN deficits, strength and sensation intact throughout.   Skin: No gross skin abnormalities or rashes    LABS:                        10.9   7.89  )-----------( 260      ( 31 Mar 2022 05:51 )             33.4     03-31    141  |  110<H>  |  9   ----------------------------<  83  4.0   |  24  |  0.97    Ca    8.4      31 Mar 2022 05:51  Phos  2.3     03-31  Mg     2.2     03-31    TPro  5.5<L>  /  Alb  3.7  /  TBili  1.1  /  DBili  x   /  AST  22  /  ALT  17  /  AlkPhos  55  03-30    PT/INR - ( 29 Mar 2022 19:17 )   PT: 11.9 sec;   INR: 1.03       PTT - ( 29 Mar 2022 19:17 )  PTT:32.4 sec    RADIOLOGY & ADDITIONAL TESTS:    MEDICATIONS  (STANDING):  chlorhexidine 2% Cloths 1 Application(s) Topical <User Schedule>  influenza   Vaccine 0.5 milliLiter(s) IntraMuscular once  pantoprazole Infusion 8 mG/Hr (10 mL/Hr) IV Continuous <Continuous>  sucralfate suspension 1 Gram(s) Oral every 6 hours    MEDICATIONS  (PRN):   ***Step down from MICU to [facility]***  Hospital course:  41M questionable hx of NSAID use, w/o significant PMHx p/w back pain radiating to sternum with UE BP discrepancy c/f aortic dissection. Chest CT wnl, but while in [facility] ED had x1 500cc hematemesis & melena c/f UGIB. Hgb dropped 14.4 to 12.2 to 10.7, which stabilised. Transferred to [facility] MICU for monitoring and EGD with planned intubation. EGD showed a single cratered ulcer with visible vessel (Ruddy Class IIA) was found in the incisura of the stomach s/p injection w epinephrine and BICAP. Unsuccessful attempts with apposition of ulcer edges with hemoclip. On extubation he was drowsy and not responding to painful stimuli, the horizontal nystagmus c/f absence seizure. Neurology was consulted and vEEG workup is pending. Stable to step down to [facility]    OVERNIGHT EVENTS: DOMENICA    SUBJECTIVE:  Patient seen and examined at bedside.  No complaints, ROS negative    Vital Signs Last 12 Hrs  T(F): 97.1 (03-31-22 @ 01:22), Max: 97.6 (03-30-22 @ 18:47)  HR: 66 (03-31-22 @ 04:00) (64 - 83)  BP: 100/55 (03-31-22 @ 04:00) (96/55 - 134/80)  BP(mean): 72 (03-31-22 @ 04:00) (70 - 101)  RR: 31 (03-31-22 @ 04:00) (8 - 31)  SpO2: 99% (03-31-22 @ 04:00) (98% - 100%)  I&O's Summary    29 Mar 2022 07:01  -  30 Mar 2022 07:00  --------------------------------------------------------  IN: 720 mL / OUT: 0 mL / NET: 720 mL    30 Mar 2022 07:01  -  31 Mar 2022 06:45  --------------------------------------------------------  IN: 590 mL / OUT: 600 mL / NET: -10 mL    PHYSICAL EXAM:  Constitutional: NAD, comfortable in bed.  HEENT: NC/AT, PERRLA, EOMI, no conjunctival pallor or scleral icterus, MMM  Neck: Supple, no JVD  Respiratory: CTA B/L. No w/r/r.   Cardiovascular: RRR, normal S1 and S2, no m/r/g.   Gastrointestinal: +BS, soft NTND, no guarding or rebound tenderness, no palpable masses   Extremities: wwp; no cyanosis, clubbing or edema.   Vascular: Pulses equal and strong throughout.   Neurological: AAOx3, no CN deficits, strength and sensation intact throughout.   Skin: No gross skin abnormalities or rashes    LABS:                        10.9   7.89  )-----------( 260      ( 31 Mar 2022 05:51 )             33.4     03-31    141  |  110<H>  |  9   ----------------------------<  83  4.0   |  24  |  0.97    Ca    8.4      31 Mar 2022 05:51  Phos  2.3     03-31  Mg     2.2     03-31    TPro  5.5<L>  /  Alb  3.7  /  TBili  1.1  /  DBili  x   /  AST  22  /  ALT  17  /  AlkPhos  55  03-30    PT/INR - ( 29 Mar 2022 19:17 )   PT: 11.9 sec;   INR: 1.03       PTT - ( 29 Mar 2022 19:17 )  PTT:32.4 sec    RADIOLOGY & ADDITIONAL TESTS:    MEDICATIONS  (STANDING):  chlorhexidine 2% Cloths 1 Application(s) Topical <User Schedule>  influenza   Vaccine 0.5 milliLiter(s) IntraMuscular once  pantoprazole Infusion 8 mG/Hr (10 mL/Hr) IV Continuous <Continuous>  sucralfate suspension 1 Gram(s) Oral every 6 hours    MEDICATIONS  (PRN):

## 2022-04-05 DIAGNOSIS — E66.9 OBESITY, UNSPECIFIED: ICD-10-CM

## 2022-04-05 DIAGNOSIS — Z59.00 HOMELESSNESS UNSPECIFIED: ICD-10-CM

## 2022-04-05 DIAGNOSIS — D62 ACUTE POSTHEMORRHAGIC ANEMIA: ICD-10-CM

## 2022-04-05 DIAGNOSIS — K25.4 CHRONIC OR UNSPECIFIED GASTRIC ULCER WITH HEMORRHAGE: ICD-10-CM

## 2022-04-05 DIAGNOSIS — K92.0 HEMATEMESIS: ICD-10-CM

## 2022-04-05 DIAGNOSIS — M54.9 DORSALGIA, UNSPECIFIED: ICD-10-CM

## 2022-04-05 SDOH — ECONOMIC STABILITY - HOUSING INSECURITY: HOMELESSNESS UNSPECIFIED: Z59.00
